# Patient Record
Sex: MALE | Race: WHITE | NOT HISPANIC OR LATINO | Employment: FULL TIME | URBAN - METROPOLITAN AREA
[De-identification: names, ages, dates, MRNs, and addresses within clinical notes are randomized per-mention and may not be internally consistent; named-entity substitution may affect disease eponyms.]

---

## 2018-02-13 ENCOUNTER — OFFICE VISIT CONVERTED (OUTPATIENT)
Dept: ONCOLOGY | Facility: HOSPITAL | Age: 53
End: 2018-02-13
Attending: PHYSICIAN ASSISTANT

## 2018-03-01 ENCOUNTER — OFFICE VISIT CONVERTED (OUTPATIENT)
Dept: ONCOLOGY | Facility: HOSPITAL | Age: 53
End: 2018-03-01
Attending: INTERNAL MEDICINE

## 2018-05-17 ENCOUNTER — OFFICE VISIT CONVERTED (OUTPATIENT)
Dept: ONCOLOGY | Facility: HOSPITAL | Age: 53
End: 2018-05-17
Attending: INTERNAL MEDICINE

## 2018-08-01 ENCOUNTER — OFFICE VISIT CONVERTED (OUTPATIENT)
Dept: ONCOLOGY | Facility: HOSPITAL | Age: 53
End: 2018-08-01
Attending: NURSE PRACTITIONER

## 2018-08-20 ENCOUNTER — OFFICE VISIT CONVERTED (OUTPATIENT)
Dept: GASTROENTEROLOGY | Facility: CLINIC | Age: 53
End: 2018-08-20
Attending: PHYSICIAN ASSISTANT

## 2019-01-31 ENCOUNTER — HOSPITAL ENCOUNTER (OUTPATIENT)
Dept: OTHER | Facility: HOSPITAL | Age: 54
Discharge: HOME | End: 2019-01-31

## 2019-02-06 ENCOUNTER — HOSPITAL ENCOUNTER (OUTPATIENT)
Dept: ULTRASOUND IMAGING | Facility: HOSPITAL | Age: 54
Discharge: HOME | End: 2019-02-06

## 2019-03-15 ENCOUNTER — OFFICE VISIT CONVERTED (OUTPATIENT)
Dept: OTOLARYNGOLOGY | Facility: CLINIC | Age: 54
End: 2019-03-15
Attending: OTOLARYNGOLOGY

## 2019-10-14 ENCOUNTER — HOSPITAL ENCOUNTER (OUTPATIENT)
Dept: OTHER | Facility: HOSPITAL | Age: 54
Discharge: HOME | End: 2019-10-14
Attending: FAMILY MEDICINE

## 2019-10-14 ENCOUNTER — HOSPITAL ENCOUNTER (OUTPATIENT)
Dept: GENERAL RADIOLOGY | Facility: HOSPITAL | Age: 54
Discharge: HOME | End: 2019-10-14
Attending: FAMILY MEDICINE

## 2020-03-11 ENCOUNTER — OFFICE VISIT CONVERTED (OUTPATIENT)
Dept: UROLOGY | Facility: CLINIC | Age: 55
End: 2020-03-11
Attending: NURSE PRACTITIONER

## 2020-03-12 ENCOUNTER — HOSPITAL ENCOUNTER (OUTPATIENT)
Dept: PERIOP | Facility: HOSPITAL | Age: 55
Setting detail: HOSPITAL OUTPATIENT SURGERY
Discharge: HOME | End: 2020-03-12
Attending: UROLOGY

## 2020-03-18 ENCOUNTER — OFFICE VISIT CONVERTED (OUTPATIENT)
Dept: UROLOGY | Facility: CLINIC | Age: 55
End: 2020-03-18
Attending: SURGERY

## 2020-04-03 ENCOUNTER — HOSPITAL ENCOUNTER (OUTPATIENT)
Dept: GENERAL RADIOLOGY | Facility: HOSPITAL | Age: 55
Discharge: HOME | End: 2020-04-03
Attending: UROLOGY

## 2020-04-03 ENCOUNTER — OFFICE VISIT CONVERTED (OUTPATIENT)
Dept: UROLOGY | Facility: CLINIC | Age: 55
End: 2020-04-03
Attending: UROLOGY

## 2020-04-08 ENCOUNTER — HOSPITAL ENCOUNTER (OUTPATIENT)
Dept: CARDIOLOGY | Facility: HOSPITAL | Age: 55
Discharge: HOME | End: 2020-04-08
Attending: SURGERY

## 2020-04-08 ENCOUNTER — OFFICE VISIT CONVERTED (OUTPATIENT)
Dept: SURGERY | Facility: CLINIC | Age: 55
End: 2020-04-08
Attending: SURGERY

## 2020-04-16 ENCOUNTER — HOSPITAL ENCOUNTER (OUTPATIENT)
Dept: GENERAL RADIOLOGY | Facility: HOSPITAL | Age: 55
Discharge: HOME | End: 2020-04-16
Attending: SURGERY

## 2020-08-14 ENCOUNTER — HOSPITAL ENCOUNTER (OUTPATIENT)
Dept: OTHER | Facility: HOSPITAL | Age: 55
Discharge: HOME | End: 2020-08-14
Attending: SURGERY

## 2020-08-19 ENCOUNTER — OFFICE VISIT CONVERTED (OUTPATIENT)
Dept: SURGERY | Facility: CLINIC | Age: 55
End: 2020-08-19
Attending: SURGERY

## 2020-09-30 ENCOUNTER — HOSPITAL ENCOUNTER (OUTPATIENT)
Dept: PREADMISSION TESTING | Facility: HOSPITAL | Age: 55
Discharge: HOME | End: 2020-09-30
Attending: SURGERY

## 2020-10-01 LAB — SARS-COV-2 RNA SPEC QL NAA+PROBE: NOT DETECTED

## 2020-10-05 ENCOUNTER — HOSPITAL ENCOUNTER (OUTPATIENT)
Dept: PERIOP | Facility: HOSPITAL | Age: 55
Setting detail: HOSPITAL OUTPATIENT SURGERY
Discharge: HOME | End: 2020-10-05
Attending: SURGERY

## 2020-10-21 ENCOUNTER — OFFICE VISIT CONVERTED (OUTPATIENT)
Dept: SURGERY | Facility: CLINIC | Age: 55
End: 2020-10-21
Attending: SURGERY

## 2020-10-21 ENCOUNTER — HOSPITAL ENCOUNTER (OUTPATIENT)
Dept: LAB | Facility: HOSPITAL | Age: 55
Discharge: HOME | End: 2020-10-21
Attending: SURGERY

## 2020-10-21 LAB — PSA SERPL-MCNC: 3.94 NG/ML (ref 0–4)

## 2020-11-05 ENCOUNTER — OFFICE VISIT CONVERTED (OUTPATIENT)
Dept: SURGERY | Facility: CLINIC | Age: 55
End: 2020-11-05
Attending: SURGERY

## 2020-11-05 ENCOUNTER — HOSPITAL ENCOUNTER (OUTPATIENT)
Dept: PREADMISSION TESTING | Facility: HOSPITAL | Age: 55
Discharge: HOME | End: 2020-11-05
Attending: SURGERY

## 2020-11-06 LAB — SARS-COV-2 RNA SPEC QL NAA+PROBE: NOT DETECTED

## 2020-11-10 ENCOUNTER — HOSPITAL ENCOUNTER (OUTPATIENT)
Dept: GASTROENTEROLOGY | Facility: HOSPITAL | Age: 55
Setting detail: HOSPITAL OUTPATIENT SURGERY
Discharge: HOME | End: 2020-11-10
Attending: SURGERY

## 2021-03-18 ENCOUNTER — OFFICE VISIT CONVERTED (OUTPATIENT)
Dept: OTOLARYNGOLOGY | Facility: CLINIC | Age: 56
End: 2021-03-18
Attending: OTOLARYNGOLOGY

## 2021-03-29 ENCOUNTER — HOSPITAL ENCOUNTER (OUTPATIENT)
Dept: OTHER | Facility: HOSPITAL | Age: 56
Discharge: HOME | End: 2021-03-29
Attending: OTOLARYNGOLOGY

## 2021-04-01 ENCOUNTER — TELEPHONE CONVERTED (OUTPATIENT)
Dept: OTOLARYNGOLOGY | Facility: CLINIC | Age: 56
End: 2021-04-01
Attending: OTOLARYNGOLOGY

## 2021-04-12 ENCOUNTER — HOSPITAL ENCOUNTER (OUTPATIENT)
Dept: ULTRASOUND IMAGING | Facility: HOSPITAL | Age: 56
Discharge: HOME | End: 2021-04-12
Attending: OTOLARYNGOLOGY

## 2021-04-21 ENCOUNTER — TELEPHONE CONVERTED (OUTPATIENT)
Dept: OTOLARYNGOLOGY | Facility: CLINIC | Age: 56
End: 2021-04-21
Attending: OTOLARYNGOLOGY

## 2021-05-10 NOTE — H&P
History and Physical      Patient Name: Jaime Ruano   Patient ID: [id number]   Sex: Male   YOB: 1965    Primary Care Provider: Antonia Lilly    Visit Date: November 5, 2020    Provider: Randal Fields MD   Location: [facility]   Location Address: [address] [place] [address]  [id number]   Location Phone: [phone]          Chief Complaint  · Outpatient History & Physical / Surgical Orders  · Follow Up  · Colon Consult      History Of Present Illness  Jaime Ruano is a 55 year old /White male who follows-up with a history of a colovesicular fistula as well as an incisional hernia. He reports he is having some pain at his previous IV site that has been going on for about a week or so. He is having some induration there and a little bit of pain. No signs of infection. No drainage. Additionally, he is due for his follow-up colonoscopy after his colon resection. He is not having any symptoms. He is eating and drinking normally and having regular bowel movements. No blood per rectum.       Past Medical History  Colovesical fistula; Diverticulosis; DVT (deep venous thrombosis); Thyroid nodule         Past Surgical History  Appendectomy; Colonoscopy; Partial cystectomy, simple; Sigmoid Colectomy With Anastomosis; Vasectomy         Medication List  Aspirin Low Dose 81 mg oral tablet,delayed release (DR/EC); Suprep Bowel Prep Kit 17.5-3.13-1.6 gram oral recon soln         Allergy List  amoxicillin; HEPARIN AGENTS; tetracycline       Allergies Reconciled  Family Medical History  Cancer, Unspecified; - No Family History of Colorectal Cancer         Social History  Alcohol (Current some day); Tobacco (Never)         Review of Systems  · Constitutional  o Denies  o : chills, fever  · Gastrointestinal  o Denies  o : nausea, vomiting      Vitals  Date Time BP Position Site L\R Cuff Size HR RR TEMP (F) WT  HT  BMI kg/m2 BSA m2 O2 Sat FR L/min FiO2 HC       11/05/2020  "09:38 AM       12  175lbs 2oz 5'  7\" 27.43 1.94             Physical Examination  · Constitutional  o Appearance  o : well developed, well-nourished, alert and in no acute distress  · Head and Face  o Head  o :   § Inspection  § : no deformities or lesions  · Eyes  o Conjunctivae  o : clear  o Sclerae  o : clear  · Neck  o Inspection/Palpation  o : normal appearance, no masses or tenderness, trachea midline  · Respiratory  o Respiratory Effort  o : breathing unlabored  o Inspection of Chest  o : normal appearance, no retractions  · Cardiovascular  o Heart  o : regular rate and rhythm  · Gastrointestinal  o Abdominal Examination  o : abdomen is soft   · Lymphatic  o Neck  o : no lymphadenopathy present  o Axilla  o : no lymphadenopathy present  o Groin  o : no lymphadenopathy present  · Skin and Subcutaneous Tissue  o General Inspection  o : his left arm has a small indurated area in the mid portion of his forearm. There is no overlying erythema. It is minimally tender to palpation, if at all. It feels like it may be either a lipoma or an old hematoma.   · Neurologic  o Cranial Nerves  o : grossly intact  o Sensation  o : grossly intact  o Gait and Station  o :   § Gait Screening  § : normal gait, able to stand without diffculty  o Cerebellar Function  o : no obvious abnormalities  · Psychiatric  o Judgement and Insight  o : judgment and insight intact  o Mood and Affect  o : mood normal, affect appropriate          Assessment  · Pre-Surgical Orders     V72.84  · Screening for colon cancer     V76.51/Z12.11  · Preop testing     V72.84/Z01.818  · History of colon resection     V45.89/Z90.49       Patient with a wound on his arm that is likely a small hematoma or lipoma.       Plan  · Orders  o Colonoscopy (23762) - V76.51/Z12.11, V45.89/Z90.49 - 11/10/2020  o [facility] [place] [facility] Pre-Op Covid-19 Screening (38053) - V72.84/Z01.818 - 11/05/2020  · Medications  o Medications have been Reconciled  o Transition of Care or Provider " Policy  · Instructions  o PLAN: Colonoscopy with possible biopsies  o Handouts Provided-Pre-Procedure Instructions including date and time and location of procedure.  o Surgical Facility: [facility]  o ****Patient Status****  o Outpatient  o ********************  o RISK AND BENEFITS:  o Consent for surgery: Given these options, the patient has verbally expressed an understanding of the risks of surgery and finds these risks acceptable. We will proceed with surgery as soon as possible.  o Consult Anesthesia for any post-operative block, or any pain management procedure deemed necessary by the anestesiologist for adequate post-operative pain control.   o O.R. PREP: Per protocol  o IV: Per Anesthesia  o PLEASE SIGN PERMIT FOR: Colonoscopy with possible biopsies  o Indocyanine Green- 2.5MG IV- On Call To OR  o *___The above History and Physical Examination has been completed within 30 days of admission.  o Electronically Identified Patient Education Materials Provided Electronically     I have offered him an ultrasound. At this point in time, I think it will resolve on its own so he wants to wait and will follow-up with me and if it doesn't resolve and we can schedule the ultrasound. In regards to his follow-up, we will go ahead and get him set up for his colonoscopy after his resection. Risks, benefits, and alternatives were discussed with the patient extensively. All questions were answered. The patient voiced understanding and agrees to proceed with the above plan.             Electronically Signed by: Salome Flores-, -Author on November 5, 2020 03:26:04 PM  Electronically Co-signed by: Randal Fields MD -Reviewer on November 6, 2020 11:17:33 PM

## 2021-05-12 NOTE — PROGRESS NOTES
Progress Note      Patient Name: Jaime Ruano   Patient ID: [id number]   Sex: Male   YOB: 1965    Primary Care Provider: Yesi ASTUDILLO   Referring Provider: Yesi ASTUDILLO    Visit Date: April 8, 2020    Provider: Randal Fields MD   Location: [facility]   Location Address: [address] [place] [address]  [id number]   Location Phone: [phone]          Chief Complaint  · Follow Up Surgery      History Of Present Illness  Jaime Ruano is a 54 year old /White male who presents today for a postoperative visit. He follows-up status post exploratory laparotomy and colovesicular fistula takedown. The patient reports he is doing pretty well after his procedure. He is eating and drinking and having regular bowel movements. He is not reporting any unexpected signs or symptoms. He is still feeling a little bit of soreness and pain but overall feels like he is doing fairly well. His only other complaint is some cramping and pain that ascends up his bilateral lower extremities, more on the left lower and right upper. He doesn't have any erythema or pain, it is mostly cramping sensation and swelling.       Past Medical History  Colovesical fistula; Diverticulosis; DVT (deep venous thrombosis); Thyroid nodule         Past Surgical History  Appendectomy; Colonoscopy; Partial cystectomy, simple; Sigmoid Colectomy With Anastomosis; Vasectomy         Medication List  Aspirin Low Dose 81 mg oral tablet,delayed release (DR/EC); hydrocodone-acetaminophen 5-325 mg oral tablet; tamsulosin 0.4 mg oral capsule         Allergy List  amoxicillin; HEPARIN AGENTS; tetracycline       Allergies Reconciled  Family Medical History  Cancer, Unspecified; - No Family History of Colorectal Cancer         Social History  Alcohol (Current some day); Tobacco (Never)         Review of Systems  · Constitutional  o Denies  o : chills, fever  · Gastrointestinal  o Denies  o : nausea,  "vomiting      Vitals  Date Time BP Position Site L\R Cuff Size HR RR TEMP (F) WT  HT  BMI kg/m2 BSA m2 O2 Sat HC       04/08/2020 09:22 AM       16  170lbs 4oz 5'  7\" 26.66 1.91           Physical Examination  · Constitutional  o Appearance  o : well developed, well-nourished, alert and in no acute distress  · Head and Face  o Head  o :   § Inspection  § : no deformities or lesions  · Eyes  o Conjunctivae  o : clear  o Sclerae  o : nonicteric  · Neck  o Inspection/Palpation  o : normal appearance, no masses or tenderness, trachea midline  · Respiratory  o Respiratory Effort  o : breathing unlabored  o Inspection of Chest  o : normal appearance, no retractions  · Cardiovascular  o Heart  o : regular rate and rhythm  · Gastrointestinal  o Abdominal Examination  o :   § Abdomen  § : soft, nontender, nondistended. His incision appears to be healing well. I removed his staples today and placed Steri-Strips.   · Lymphatic  o Neck  o : no lymphadenopathy present  o Axilla  o : no lymphadenopathy present  o Groin  o : no lymphadenopathy present  · Skin and Subcutaneous Tissue  o General Inspection  o : no rashes present, no lesions present, no areas of discoloration  · Neurologic  o Cranial Nerves  o : grossly intact  o Sensation  o : grossly intact  o Gait and Station  o :   § Gait Screening  § : normal gait, able to stand without diffculty  o Cerebellar Function  o : no obvious abnormalities  · Psychiatric  o Judgement and Insight  o : judgment and insight intact  o Mood and Affect  o : mood normal, affect appropriate  · Extremities  o Extremities  o : on his lower extremities, I don't feel any severe pitting edema or erythema. He doesn't have any severe tenderness to palpation.           Assessment  · Postoperative Exam Following Surgery     V67.00  · Leg swelling     729.81/M79.89  · History of DVT (deep vein thrombosis)     V12.51/Z86.718       Patient status post colovesicular fistula takedown.     Problems " Reconciled  Plan  · Orders  o Bilateral venous duplex scan of extremities (44851) - - 04/08/2020   Lower Extremities   · Medications  o Medications have been Reconciled  o Transition of Care or Provider Policy  · Instructions  o Electronically Identified Patient Education Materials Provided Electronically     The patient is doing well and healing as expected. I am pleased with his overall progress. His staples were removed today. I think we should do a colonoscopy in six months to evaluate his anastomosis but otherwise I think he is doing quite well. In regards to his lower extremity cramping and pain, I think given his significant history of lower extremity DVTs as well as PE. He reports he was having similar sensations when he had those clots. I think performing a lower extremity Doppler is a very reasonable course of action and we will get him set up for that.     Otherwise, he does not require further follow-up from me unless he has any questions, concerns, or other issues, until I see him again in six months. I discussed all of this with the patient. All questions were answered.  He voiced understanding and agreed to proceed with the above plan.             Electronically Signed by: Salome Flores-, -Author on April 13, 2020 10:01:15 AM  Electronically Co-signed by: Randal Fields MD -Reviewer on April 13, 2020 11:25:04 AM

## 2021-05-12 NOTE — PROGRESS NOTES
"   Progress Note      Patient Name: Jaime Ruano   Patient ID: [id number]   Sex: Male   YOB: 1965    Primary Care Provider: Yesi ASTUDILLO   Referring Provider: Yesi ASTUDILLO    Visit Date: April 3, 2020    Provider: Nini Sheridan MD   Location: [facility]   Location Address: [address] [place] [address]  [id number]   Location Phone: [phone]          Chief Complaint  · [facility]      History Of Present Illness  The patient is a(n) 54 year old /White male who returns for a routine post-operative visit after undergoing cystoscopy for colovesical fistula on 03/12/2020 and then subsequent excision of fistula and repair of bladder along with sigmoid colectomy with Dr. Fields.   The patient complains of no significant symptoms or problems since the surgery.   Since the surgery the patient has returned to full daily activities      Cystogram was normal and alex catheter removed today.       Past Medical History  Colovesical fistula; Diverticulosis; DVT (deep venous thrombosis); Thyroid nodule         Past Surgical History  Appendectomy; Colonoscopy; Partial cystectomy, simple; Sigmoid Colectomy With Anastomosis; Vasectomy         Medication List  Aspirin Low Dose 81 mg oral tablet,delayed release (DR/EC); hydrocodone-acetaminophen 5-325 mg oral tablet         Allergy List  amoxicillin; HEPARIN AGENTS; tetracycline         Family Medical History  Cancer, Unspecified; - No Family History of Colorectal Cancer         Social History  Alcohol (Current some day); Tobacco (Never)         Review of Systems  · Constitutional  o Denies  o : fever, chills  · Gastrointestinal  o Denies  o : nausea, vomiting      Vitals  Date Time BP Position Site L\R Cuff Size HR RR TEMP (F) WT  HT  BMI kg/m2 BSA m2 O2 Sat HC       04/03/2020 10:31 /79 Sitting       173lbs 4oz 5'  7\" 27.13 1.93           Physical Examination  · Constitutional  o Appearance  o : Well nourished, well " developed patient in no acute distress. Ambulating without difficulty.              Assessment  · Colovesical fistula     596.1/N32.1  · Prostate cancer screening     V76.44/Z12.5  · Postoperative Follow-Up Visit     V67.00  · Colovesical fistula     596.1/N32.1      Plan  · Orders  o PSA Ultrasensitive, ANNUAL SCREENING [facility] (64530, ) - V76.44/Z12.5 - 10/03/2020  · Medications  o tamsulosin 0.4 mg oral capsule   SIG: take 1 capsule (0.4 mg) by oral route once daily 1/2 hour following the same meal each day for 30 days   DISP: (30) capsules with 11 refills  Prescribed on 04/03/2020     o Medications have been Reconciled  o Transition of Care or Provider Policy  · Instructions  o Discussion and Plan: The patient has done well after surgery with no apparent complications. I have discussed the postoperative course to date, as well as, the expected progress going forward. The patient understands what complications to be concerned about. I will see the patient in routine follow-up, or sooner if needed.  o FOLLOW-UP:  o In 6 months            Electronically Signed by: Nini Sheridan MD -Author on May 29, 2020 11:23:09 AM

## 2021-05-13 NOTE — PROGRESS NOTES
"   Progress Note      Patient Name: Jaime Ruano   Patient ID: [id number]   Sex: Male   YOB: 1965    Primary Care Provider: Antonia Lilly    Visit Date: August 19, 2020    Provider: Randal Fields MD   Location: [facility]   Location Address: [address] [place] [address]  [id number]   Location Phone: [phone]          Chief Complaint  · Outpatient History & Physical / Surgical Orders  · Follow Up  · Hernia Consult      History Of Present Illness  Jaime Ruano is a 55 year old /White male who follows-up after a CT scan. I am familiar with Mr. Ruano after a colovesicular fistula takedown. He has done very well after his colovesicular fistula takedown. He has recently gotten back into a lot of physical activity and exercise. He noticed he is having some slight bulging around his umbilicus. We sent him for a CT scan secondary to this bulging and the CT scan shows two small periumbilical hernias about 2.0 cm a piece.       Past Medical History  Colovesical fistula; Diverticulosis; DVT (deep venous thrombosis); Thyroid nodule         Past Surgical History  Appendectomy; Colonoscopy; Partial cystectomy, simple; Sigmoid Colectomy With Anastomosis; Vasectomy         Medication List  Aspirin Low Dose 81 mg oral tablet,delayed release (DR/EC)         Allergy List  amoxicillin; HEPARIN AGENTS; tetracycline       Allergies Reconciled  Family Medical History  Cancer, Unspecified; - No Family History of Colorectal Cancer         Social History  Alcohol (Current some day); Tobacco (Never)         Review of Systems  · Gastrointestinal  o Denies  o : nausea, vomiting, diarrhea, constipation      Vitals  Date Time BP Position Site L\R Cuff Size HR RR TEMP (F) WT  HT  BMI kg/m2 BSA m2 O2 Sat HC       08/19/2020 02:51 PM         179lbs 0oz 5'  7\" 28.04 1.96           Physical Examination  · Constitutional  o Appearance  o : well developed, well-nourished, alert and in no acute " distress  · Head and Face  o Head  o :   § Inspection  § : no deformities or lesions  · Eyes  o Conjunctivae  o : clear  o Sclerae  o : clear  · Neck  o Inspection/Palpation  o : normal appearance, no masses or tenderness, trachea midline  · Respiratory  o Respiratory Effort  o : breathing unlabored  o Inspection of Chest  o : normal appearance, no retractions  · Cardiovascular  o Heart  o : regular rate and rhythm  · Gastrointestinal  o Abdominal Examination  o : abdomen is soft. His periumbilical area shows that he does have some small reducible hernias. They are nontender. No overlying skin changes.   · Lymphatic  o Neck  o : no lymphadenopathy present  o Axilla  o : no lymphadenopathy present  o Groin  o : no lymphadenopathy present  · Skin and Subcutaneous Tissue  o General Inspection  o : no rashes present, no lesions present, no areas of discoloration  · Neurologic  o Cranial Nerves  o : grossly intact  o Sensation  o : grossly intact  o Gait and Station  o :   § Gait Screening  § : normal gait, able to stand without diffculty  o Cerebellar Function  o : no obvious abnormalities  · Psychiatric  o Judgement and Insight  o : judgment and insight intact  o Mood and Affect  o : mood normal, affect appropriate          Assessment  · Pre-Surgical Orders     V72.84  · Hernia, Incisional     553.21/K43.2  · Pre-op testing     V72.84/Z01.818       Patient with incisional hernia from his colovesicular fistula takedown.     Problems Reconciled  Plan  · Orders  o General Surgery Order (GENOR) - 553.21/K43.2 - 08/31/2020  o [facility] Pre-Op Covid-19 Screening (83688) - V72.84/Z01.818 - 08/26/2020 08/26 @ 9AM -- [address]  · Medications  o Medications have been Reconciled  o Transition of Care or Provider Policy  · Instructions  o PLAN:   o Handouts Provided-Pre-Procedure Instructions including date and time and location of procedure.  o Surgical Facility: [facility]  o ****Patient  Status****  o Outpatient  o ********************  o RISK AND BENEFITS:  o Consent for surgery: Given these options, the patient has verbally expressed an understanding of the risks of surgery and finds these risks acceptable. We will proceed with surgery as soon as possible.  o Consult Anesthesia for any post-operative block, or any pain management procedure deemed necessary by the anestesiologist for adequate post-operative pain control.   o O.R. PREP: Per protocol  o IV: Per Anesthesia  o PLEASE SIGN PERMIT FOR: ROBOTIC INCISIONAL HERNIA REPAIR, POSSIBLY OPEN  o *__Kefzol 2 gram IV on call to OR.  o *___The above History and Physical Examination has been completed within 30 days of admission.  o Pre-Admission Testing Date: 08/26 @ 8AM - SAME DAY SURGERY  o Electronically Identified Patient Education Materials Provided Electronically     We will plan for robotic repair of these hernias. Risks, benefits, and alternatives were discussed with the patient extensively. All questions were answered. The patient voiced understanding and agrees to proceed with the above plan.             Electronically Signed by: Salome Flores-, -Author on August 20, 2020 01:40:09 PM  Electronically Co-signed by: Randal Fields MD -Reviewer on August 20, 2020 02:35:55 PM

## 2021-05-13 NOTE — PROGRESS NOTES
Progress Note      Patient Name: Jaime Ruano   Patient ID: [id number]   Sex: Male   YOB: 1965    Primary Care Provider: Antonia Lilly    Visit Date: October 21, 2020    Provider: Randal Fields MD   [place] [facility]   Location Address: [address] [place] [address]  [id number]   Location Phone: [phone]          Chief Complaint  · Follow Up Surgery      History Of Present Illness  Jaime Ruano is a 55 year old /White male who presents today for a postoperative visit. He follows-up status post robotic incisional hernia repair. The patient has done well after his procedure. He is not reporting any unexpected signs or symptoms. He is eating normally and having regular bowel movements. His pain is subsiding. He doesn't report any feelings of recurrence of the hernia.       Past Medical History  Colovesical fistula; Diverticulosis; DVT (deep venous thrombosis); Thyroid nodule         Past Surgical History  Appendectomy; Colonoscopy; Partial cystectomy, simple; Sigmoid Colectomy With Anastomosis; Vasectomy         Medication List  Aspirin Low Dose 81 mg oral tablet,delayed release (DR/EC)         Allergy List  amoxicillin; HEPARIN AGENTS; tetracycline       Allergies Reconciled  Family Medical History  Cancer, Unspecified; - No Family History of Colorectal Cancer         Social History  Alcohol (Current some day); Tobacco (Never)         Review of Systems  · Constitutional  o Denies  o : chills, excessive sweating, fatigue, fever, sycope/passing out, weight gain, weight loss  · Eyes  o Denies  o : changes in vision, blurry vision, double vision  · HENT  o Denies  o : loss of hearing, ringing in the ears, ear aches, sore throat, nasal congestion, sinus pain, nose bleeds, seasonal allergies  · Cardiovascular  o Denies  o : blood clots, swollen legs, anemia, easy burising or bleeding, transfusions  · Respiratory  o Denies  o : shortness of breath, dry cough,  "productive cough, pneumonia, COPD  · Gastrointestinal  o Denies  o : difficulty swallowing, reflux  · Genitourinary  o Denies  o : incontinence  · Neurologic  o Denies  o : headache, seizure, stroke, tremor, loss of balance, falls, dizziness/vertigo, difficulty with sleep, numbness/tingling/paresthesia , difficulty with coordination, difficulty with dexterity, weakness  · Musculoskeletal  o Denies  o : neck stiffness/pain, swollen lymph nodes, muscle aches, joint pain, weakness, spasms, sciatica, pain radiating in arm, pain radiating in leg, low back pain  · Endocrine  o Denies  o : diabetes, thyroid disorder  · Psychiatric  o Denies  o : anxiety, depression      Vitals  Date Time BP Position Site L\R Cuff Size HR RR TEMP (F) WT  HT  BMI kg/m2 BSA m2 O2 Sat FR L/min FiO2        10/21/2020 10:31 AM       14  174lbs 0oz 5'  7\" 27.25 1.93             Physical Examination  · Constitutional  o Appearance  o : well developed, well-nourished, alert and in no acute distress  · Head and Face  o Head  o :   § Inspection  § : no deformities or lesions  · Eyes  o Conjunctivae  o : clear  o Sclerae  o : nonicteric  · Neck  o Inspection/Palpation  o : normal appearance, no masses or tenderness, trachea midline  · Respiratory  o Respiratory Effort  o : breathing unlabored  o Inspection of Chest  o : normal appearance, no retractions  · Cardiovascular  o Heart  o : regular rate and rhythm  · Gastrointestinal  o Abdominal Examination  o :   § Abdomen  § : abdomen is soft. Incisions appear to be healing well. No signs of recurrence, even with Valsalva.   · Lymphatic  o Neck  o : no lymphadenopathy present  o Axilla  o : no lymphadenopathy present  o Groin  o : no lymphadenopathy present  · Skin and Subcutaneous Tissue  o General Inspection  o : no rashes present, no lesions present, no areas of discoloration  · Neurologic  o Cranial Nerves  o : grossly intact  o Sensation  o : grossly intact  o Gait and Station  o :   § Gait " Screening  § : normal gait, able to stand without diffculty  o Cerebellar Function  o : no obvious abnormalities  · Psychiatric  o Judgement and Insight  o : judgment and insight intact  o Mood and Affect  o : mood normal, affect appropriate          Assessment  · Encounter for examination following surgery     V67.00/Z09  · Screening PSA (prostate specific antigen)     V76.44/Z12.5       Patient status post robotic incisional hernia repair.       Plan  · Orders  o PSA Ultrasensitive, ANNUAL SCREENING [facility] ([id number], ) - V76.44/Z12.5 - 10/21/2020  · Medications  o Medications have been Reconciled  o Transition of Care or Provider Policy  · Instructions  o Electronically Identified Patient Education Materials Provided Electronically     The patient is doing well and healing as expected. I am pleased with his overall progress. At this point in time, he can follow-up with me as needed. Call with any questions or concerns. The patient reports he is due for a routine PSA. I will go ahead and order that and he can follow-up with his urologist for the results. I discussed all of this with the patient. All questions were answered. He voiced understanding and agreed to proceed with the above plan.             Electronically Signed by: Salome Flores-, -Author on October 21, 2020 02:26:26 PM  Electronically Co-signed by: Randal Fields MD -Reviewer on October 22, 2020 11:37:48 AM

## 2021-05-14 VITALS — BODY MASS INDEX: 27.48 KG/M2 | HEIGHT: 67 IN | RESPIRATION RATE: 12 BRPM | WEIGHT: 175.12 LBS

## 2021-05-14 VITALS — BODY MASS INDEX: 27.31 KG/M2 | HEIGHT: 67 IN | RESPIRATION RATE: 14 BRPM | WEIGHT: 174 LBS

## 2021-05-14 VITALS — HEIGHT: 67 IN | TEMPERATURE: 98.8 F | BODY MASS INDEX: 29.27 KG/M2 | WEIGHT: 186.5 LBS

## 2021-05-14 NOTE — PROGRESS NOTES
"   Progress Note      Patient Name: Jaime Ruano   Patient ID: [id number]   Sex: Male   YOB: 1965    Primary Care Provider: Antonia Lilly    Visit Date: March 18, 2021    Provider: Guilherme Haskins MD   [facility]   Location Address: [address] [place] [address]  [id number]          Chief Complaint     \"I just wanted to check in about my thyroid.\"       History Of Present Illness     Jaime Ruano is a 55 year old /White male with past medical history significant for factor V Leiden with history of DVT and PE who returns to the office today for follow-up of thyroid nodules.  He was originally seen on 3/15/2019 after the nodules were initially noted on workup of his DVT/PE. He reported a family history of hypothyroidism but denies any history of thyroid cancer. He denied any personal history of radiation exposure.  Thyroid ultrasound on 1/31/19 which revealed a right 0.8 x 0.8 x 1 cm solid nodule, a 0.6 cm hypoechoic nodule, and a right lower 1.5 x 1 x 1.7 cm nodule with several echogenic foci, and a left 0.6 cm hypoechoic nodule. Subsequent ultrasound-guided FNA of the right 1.7 cm nodule on 2/6/19 revealed atypia of undetermined significance (Lee category III).  Examination that day was unremarkable and we discussed a repeat FNA.  He was advised to call to arrange a repeat FNA versus follow-up ultrasound but was lost to follow-up.  He tells me that he has not had any issues since his last visit.  He denies any hoarseness, dysphagia, neck pain, fever, chills, night sweats, or unintentional weight loss.  He has not undergone any recent imaging.  He did have surgery this past year for a colovesicular fistula likely secondary to diverticulitis.  He is doing well.             Past Medical History  Colovesical fistula; Diverticulosis; DVT (deep venous thrombosis); Factor V Leiden; Thyroid nodule         Past Surgical History  Appendectomy; " "Colonoscopy; Partial cystectomy, simple; Sigmoid Colectomy With Anastomosis; Vasectomy         Medication List  Aspirin Low Dose 81 mg oral tablet,delayed release (DR/EC)         Allergy List  amoxicillin; HEPARIN AGENTS; tetracycline         Family Medical History  Cancer, Unspecified; - No Family History of Colorectal Cancer         Social History  Alcohol (Current some day); Tobacco (Never)         Review of Systems  · Constitutional  o Denies  o : fever, night sweats, weight loss  · Eyes  o Denies  o : discharge from eye, impaired vision  · HENT  o Admits  o : *See HPI  · Cardiovascular  o Denies  o : chest pain, irregular heart beats  · Respiratory  o Denies  o : shortness of breath, wheezing, coughing up blood  · Gastrointestinal  o Denies  o : heartburn, reflux, vomiting blood  · Genitourinary  o Denies  o : frequency  · Integument  o Denies  o : rash, skin dryness  · Neurologic  o Denies  o : seizures, loss of balance, loss of consciousness, dizziness  · Endocrine  o Denies  o : cold intolerance, heat intolerance  · Heme-Lymph  o Denies  o : easy bleeding, anemia      Vitals  Date Time BP Position Site L\R Cuff Size HR RR TEMP (F) WT  HT  BMI kg/m2 BSA m2 O2 Sat FR L/min FiO2 HC       03/18/2021 01:48 PM        98.8 186lbs 8oz 5'  7\" 29.21 2       03/18/2021 01:55 PM        98.8 186lbs 8oz 5'  7\" 29.21 2             Physical Examination  · Constitutional  o Appearance  o : well developed, well-nourished, alert and in no acute distress, voice clear and strong  · Head and Face  o Head  o :   § Inspection  § : no deformities or lesions  o Face  o :   § Inspection  § : No facial lesions; House-Brackmann I/VI bilaterally  § Palpation  § : No TMJ crepitus nor  muscle tenderness bilaterally  · Eyes  o Vision  o :   § Visual Fields  § : Extraocular movements are intact. No spontaneous or gaze-induced nystagmus.  o Conjunctivae  o : clear  o Sclerae  o : clear  o Pupils and Irises  o : pupils equal, round, " and reactive to light.   · Ears, Nose, Mouth and Throat  o Ears  o :   § External Ears  § : appearance within normal limits, no lesions present  § Otoscopic Examination  § : tympanic membrane appearance within normal limits bilaterally without perforations, well-aerated middle ears  § Hearing  § : intact to conversational voice both ears  o Nose  o :   § External Nose  § : appearance normal  § Intranasal Exam  § : mucosa within normal limits, vestibules normal, no intranasal lesions present, septum midline, sinuses non tender to percussion  o Oral Cavity  o :   § Oral Mucosa  § : oral mucosa normal without pallor or cyanosis  § Lips  § : lip appearance normal  § Teeth  § : normal dentition for age  § Gums  § : gums pink, non-swollen, no bleeding present  § Tongue  § : tongue appearance normal; normal mobility  § Palate  § : hard palate normal, soft palate appearance normal with symmetric mobility  o Throat  o :   § Oropharynx  § : no inflammation or lesions present, tonsils within normal limits  § Hypopharynx  § : Deferred secondary to gag reflex  § Larynx  § : Deferred secondary to gag reflex  · Neck  o Inspection/Palpation  o : normal appearance, no masses or tenderness, trachea midline; thyroid size normal, nontender, no nodules or masses present on palpation  · Respiratory  o Respiratory Effort  o : breathing unlabored  o Inspection of Chest  o : normal appearance, no retractions  · Cardiovascular  o Heart  o : regular rate and rhythm  · Lymphatic  o Neck  o : no lymphadenopathy present  o Supraclavicular Nodes  o : no lymphadenopathy present  o Preauricular Nodes  o : no lymphadenopathy present  · Skin and Subcutaneous Tissue  o General Inspection  o : Regarding face and neck - there are no rashes present, no lesions present, and no areas of discoloration  · Neurologic  o Cranial Nerves  o : cranial nerves II-XII are grossly intact bilaterally  o Gait and Station  o : normal gait, able to stand without  diffculty  · Psychiatric  o Judgement and Insight  o : judgment and insight intact  o Mood and Affect  o : mood normal, affect appropriate          Assessment  · Thyroid nodule     241.0/E04.1  · Factor V Leiden     289.81/D68.51      Plan  · Orders  o Ultrasound Thyroid. (30549) - 241.0/E04.1 - 03/18/2021  · Medications  o Medications have been Reconciled  o Transition of Care or Provider Policy  · Instructions  o Impressions and findings were discussed Mr. Ruano at great length. Currently, he is doing well and returns for follow-up of multiple thyroid nodules the largest of which was a 1.7 cm right inferior nodule with echogenic foci for which a previous FNA was consistent with atypia of undetermined significance. We discussed that he will require repeat ultrasound and we will discuss results over the phone and determine further action from there. He was given ample time to ask questions, all of which were answered to his satisfaction.            Electronically Signed by: Guilherme Haskins MD -Author on March 18, 2021 02:06:15 PM

## 2021-05-14 NOTE — PROGRESS NOTES
Progress Note      Patient Name: Jaime Ruano   Patient ID: [id number]   Sex: Male   YOB: 1965    Primary Care Provider: Antonia Lilly    Visit Date: April 21, 2021    Provider: Guilherme Haskins MD   [place] [facility]   Location Address: [address] [place] [address], [address]  [place], [place]  [id number]   Location Phone: [phone]          History Of Present Illness  TELEHEALTH TELEPHONE VISIT  Jaime Ruano is a 55 year old /White male who is presenting for evaluation via telehealth telephone visit. Verbal consent obtained before beginning visit. We are speaking today to discuss the results of his most recent ultrasound-guided FNA. He previously underwent an ultrasound-guided FNA of a right 1.7 cm nodule which was consistent with atypia of undetermined significance. Based on those 3/29/2021 thyroid ultrasound we discussed undergoing FNA of 2 of the 3 right-sided nodules. This was performed on 4/12/2021 and revealed the right superior nodule to be suspicious for follicular neoplasm (Auburn category 4) in the right inferior nodule to be consistent with follicular lesion of undetermined significance (Auburn category 3).   Provider spent 13 minutes with the patient during the telehealth visit.   The following staff were present during this visit: Guilherme Haskins MD   Past Medical History/ Overview of Patient Symptoms          Assessment  · Thyroid nodule     241.0/E04.1      Plan  · Orders  o Physician Telephone Evaluation, 11-20 minutes (30338) - 241.0/E04.1 - 04/21/2021  · Instructions  o Plan Of Care: Impressions and findings were discussed with Mr. Ruano at great length. We reviewed the results of his recent ultrasound and FNA which revealed the right superior nodule to be suspicious for follicular neoplasm (Auburn category 4) on the right inferior nodule to be consistent with a follicular lesion of undetermined significance (Auburn category 3). We discussed  that the actual risk of malignancy is 15 to 30% for the category for lesion and 5 to 15% for the category 3 lesion. At this time, I have recommended that he undergo a right thyroid lobectomy and isthmusectomy. We discussed the risks, benefits, and alternatives. He is going to think about his options and talk to him over with his wife. He will call once he reaches a decision regarding surgery.            Electronically Signed by: Guilherme Haskins MD -Author on April 21, 2021 04:31:36 PM

## 2021-05-14 NOTE — PROGRESS NOTES
Progress Note      Patient Name: Jaime Ruano   Patient ID: [id number]   Sex: Male   YOB: 1965    Primary Care Provider: Antonia Lilly    Visit Date: April 1, 2021    Provider: Guilherme Haskins MD   [facility] [facility] [place] [facility]   Location Address: [address] [place] [address]  [id number]          Chief Complaint            History Of Present Illness  TELEHEALTH TELEPHONE VISIT  Jaime Ruano is a 55 year old /White male who is presenting for evaluation via telehealth telephone visit. Verbal consent obtained before beginning visit. Prescription today to discuss the results of his 3/29/2021 thyroid ultrasound. Prior to this his most recent ultrasound was on 1/31/2019 after which she underwent an ultrasound-guided FNA of a right 1.7 cm nodule which was consistent with atypia of undetermined significance. He tells me that he has done well since his last appointment. 3/29/2021 thyroid ultrasound revealed a right 1.2 cm hypoechoic nodule, a right 1.6 cm nodule with calcifications, and a right 1.6 cm nodule. There is also left subcentimeter nodules. It was recommended that he undergo ultrasound-guided FNA of 2 of the 3 right-sided nodules.   Provider spent 7 minutes with the patient during the telehealth visit.   The following staff were present during this visit: Guilherme Haskins MD   Past Medical History/ Overview of Patient Symptoms          Assessment  · Thyroid nodule     241.0/E04.1      Plan  · Orders  o Physican Telephone evaluation, 5-10 min (24828) - 241.0/E04.1 - 04/01/2021  o FNA Thyroid w/ guidance (61640) - 241.0/E04.1 - 04/01/2021   the two larger right sided nodules please  · Instructions  o Plan Of Care: After a thorough discussion of options for further evaluation and management we will proceed with an ultrasound-guided FNA of the 2 larger right-sided nodules. We will discuss those results after.            Electronically Signed by: Guilherme FABIAN  MD Jozef -Author on April 1, 2021 03:26:46 PM

## 2021-05-15 VITALS — HEIGHT: 67 IN | BODY MASS INDEX: 27.82 KG/M2 | RESPIRATION RATE: 12 BRPM | WEIGHT: 177.25 LBS

## 2021-05-15 VITALS
HEIGHT: 67 IN | BODY MASS INDEX: 28.56 KG/M2 | SYSTOLIC BLOOD PRESSURE: 121 MMHG | WEIGHT: 182 LBS | DIASTOLIC BLOOD PRESSURE: 74 MMHG

## 2021-05-15 VITALS — BODY MASS INDEX: 26.72 KG/M2 | HEIGHT: 67 IN | WEIGHT: 170.25 LBS | RESPIRATION RATE: 16 BRPM

## 2021-05-15 VITALS
SYSTOLIC BLOOD PRESSURE: 101 MMHG | BODY MASS INDEX: 27.19 KG/M2 | HEIGHT: 67 IN | WEIGHT: 173.25 LBS | DIASTOLIC BLOOD PRESSURE: 79 MMHG

## 2021-05-15 VITALS — HEIGHT: 67 IN | WEIGHT: 179 LBS | BODY MASS INDEX: 28.09 KG/M2

## 2021-05-16 VITALS
OXYGEN SATURATION: 96 % | HEART RATE: 82 BPM | RESPIRATION RATE: 17 BRPM | HEIGHT: 67 IN | DIASTOLIC BLOOD PRESSURE: 80 MMHG | BODY MASS INDEX: 29.21 KG/M2 | SYSTOLIC BLOOD PRESSURE: 136 MMHG | TEMPERATURE: 98 F | WEIGHT: 186.12 LBS

## 2021-05-16 VITALS
RESPIRATION RATE: 12 BRPM | HEART RATE: 57 BPM | SYSTOLIC BLOOD PRESSURE: 117 MMHG | HEIGHT: 67 IN | OXYGEN SATURATION: 95 % | DIASTOLIC BLOOD PRESSURE: 66 MMHG | WEIGHT: 180.31 LBS | BODY MASS INDEX: 28.3 KG/M2

## 2021-05-28 VITALS
DIASTOLIC BLOOD PRESSURE: 70 MMHG | SYSTOLIC BLOOD PRESSURE: 111 MMHG | OXYGEN SATURATION: 95 % | HEART RATE: 63 BPM | WEIGHT: 179.01 LBS | HEIGHT: 68 IN | TEMPERATURE: 98.2 F | BODY MASS INDEX: 27.13 KG/M2

## 2021-05-28 VITALS
TEMPERATURE: 97.6 F | BODY MASS INDEX: 47.74 KG/M2 | SYSTOLIC BLOOD PRESSURE: 98 MMHG | OXYGEN SATURATION: 96 % | HEART RATE: 57 BPM | RESPIRATION RATE: 16 BRPM | HEIGHT: 68 IN | WEIGHT: 315 LBS | DIASTOLIC BLOOD PRESSURE: 66 MMHG

## 2021-05-28 VITALS
OXYGEN SATURATION: 94 % | DIASTOLIC BLOOD PRESSURE: 67 MMHG | HEIGHT: 67 IN | TEMPERATURE: 97.6 F | HEART RATE: 66 BPM | WEIGHT: 178 LBS | RESPIRATION RATE: 12 BRPM | SYSTOLIC BLOOD PRESSURE: 112 MMHG | BODY MASS INDEX: 27.94 KG/M2

## 2021-05-28 VITALS
TEMPERATURE: 97.9 F | SYSTOLIC BLOOD PRESSURE: 120 MMHG | RESPIRATION RATE: 12 BRPM | HEIGHT: 67 IN | HEART RATE: 78 BPM | DIASTOLIC BLOOD PRESSURE: 71 MMHG | BODY MASS INDEX: 28.61 KG/M2 | WEIGHT: 182.25 LBS | OXYGEN SATURATION: 94 %

## 2021-05-28 NOTE — PROGRESS NOTES
Patient: GAYLE RUANO     Acct: [id number]     Report: #[id number]  UNIT #: [id number]     : 1965    Encounter Date:2018  PRIMARY CARE: MARGARITA NEWBERRY  ***Signed***  --------------------------------------------------------------------------------------------------------------------  Visit Type      Established Patient Visit            Chief Complaint      DVT/PE due to heterozygous P506Q Factor V Leiden mutation            Referring Provider/Copies To      Referring Provider:  MARGARITA NEWBERRY            Allergies      Coded Allergies:             HEPARIN (Verified  Allergy, Unknown, REDNESS, 18)       WAS STARTED ON HEPARIN DRIP IN ED, RED PATCH APPEARED ON       RIGHT SIDE OF NECK, NO OTHER SYMPTOMS, ED GAVE BENEDRYL TO       BE SAFE, REDDNESS WENT AWAY.           TETRACYCLINE (Verified  Allergy, Unknown, TONGUE SWELLING, 18)            Medications      Last Reconciled on 18 16:06 by BAUDILIO IVORY      Apixaban (Eliquis) 5 Mg Tablet      5 MG PO BID for 30 Days, #60 TAB 3 Refills         Prov: Omid Mendoza         3/7/18      Medications Reviewed:  No Changes made to meds            History and Present Illness      Past Oncology Illness History      Mr. Gayle Ruano is a 52 year old  man with no history of blood clots     prior to 2018 who is a non smoker. He has an active lifestyle and SOA was noted     during exercise. He was admitted to [facility] at the end of 2018 after SOA     and coughing up blood. He was found to have bilateral pulmonary emboli and left     lower extremity DVT extending into mid thigh. Duplex 18 showed a LLE     tibial vein DVT. CT of the abdomen 18 during his admission showed acute     diverticulitis although he states he was asymptomatic at that time regarding     and abdominal pain.             He had a hypercoagulable workup while hospitalized which showed a heterozygous     R506Q Factor V Leiden mutation. He was started on Eliquis at  the beginning of     February 2018. He is followed by Dr. Pham (pulmonology) who has ordered a     repeat CT scan in April for further evaluation of PE resolution.  The patient     has a planned colonoscopy set for April 2018.             Shortly after hospitalization he developed abdominal pain and was treated for     diverticulitis. He completed his course of Cipro and Flagyl for diverticulitis.     The patient denies SOA, chest pain, headache. He denies any recent fever, chills    , or s/s of infection. No bleeding.            OTHER:      Recent Imaging            PROCEDURE:   CT CHEST W/ CONTRAST             COMPARISON:   None.             INDICATIONS:   SHORTNESS OF BREATH  X SEVERAL DAYS,PRESSURE ON LEFT SIDE OF     CHEST             TECHNIQUE:   After obtaining the patient's consent, CT images were obtained     with non-ionic       intravenous contrast material.               PROTOCOL:     Pulmonary embolism imaging protocol performed                RADIATION:     DLP: 603.7 mGy*cm          Automated exposure control was utilized to minimize radiation dose.       CONTRAST:   100 cc Isovue 370 I.V.             FINDINGS:         Along the inferior right thyroid lobe is a 1.2 cm thyroid nodule.             Multiple filling defects consistent with pulmonary emboli are noted in the     distal right pulmonary       artery as well as multiple segmental and subsegmental pulmonary artery branches     supplying the lower       lobes bilaterally.             No pleural or pericardial effusion is seen. There is no evidence of right     ventricular strain.             There is mild scarring or atelectasis in the lung bases bilaterally. The lungs     otherwise appear       clear.             There is diffuse fatty infiltration of the liver. No focal osseous lesion is     seen.             CONCLUSION:         1. Multiple pulmonary emboli in the distal right ulnar artery and multiple     bilateral lower lobe        pulmonary artery branches.      2. 1.2 cm right thyroid nodule.      3. Diffuse fatty infiltration of the liver.             I called the report to Dr. Butler at the time of dictation.              Facundo Rosario M.D.             Electronically Signed and Approved By: Facundo Rosario M.D. on 1/28/2018 at 18:00                   Recent Testing:            PROCEDURE:   VENOUS EVAL LOWER LIMITED             COMPARISON:   None.             INDICATIONS:   Positive Pulmonary Emboli             TECHNIQUE:   Color duplex Doppler ultrasound evaluation and analysis of the     deep venous system of the       lower extremity was performed in the usual manner.               Right Impression:       Common femoral has normal flow and compressibility, limited study by request             Left Impression:       The proximal thigh segment femoral vein, popliteal vein, and posterior tibial     vein demonstrated no       flow and are noncompressible with acute appearing thrombus             CONCLUSION:         1. Left lower extremity proximal thigh vein through posterior tibial vein deep     venous thrombosis,       acute.              Pedro Thakkar MD             Electronically Signed and Approved By: Pedro Thakkar MD on 1/29/2018 at 17:59            HPI - Oncology Interim      Presents today in follow up on his LLE DVT/PE which he was hospitalized in     January 2016.  He was found to have Factor V Leiden mutation. He was placed on     Eliquis at that time for 6 months.  He denies bruising, bleeding.            ECOG Performance Status      0            PAST, FAMILY   Past Medical History      Past Medical History:  Other      Hematology/oncology:  REPORTS HX OF: Anemia, Other hematologic history (PE)            Past Surgical History      REPORTS HX OF: Appendectomy            Family History      REPORTS HX OF: Other Cancer History (SISTER-UNKNOWN CANCER)            Social History      Lives independently:  Yes            Tobacco Use       Tobacco status:  Never smoker            Alcohol Use      Alcohol intake:  ONCE A MONTH            REVIEW OF SYSTEMS      General:  Denies: Appetite change, Fatigue, Weight loss      Eyes:  Denies: Blurred vision, Corrective lenses      Ears, nose, mouth, throat:  Denies: Headache, Seizures, Visual Changes      Cardiovascular:  Denies: Chest pain, Irregular heartbeat, Palpitations      Respiratory:  Denies: Shortness of Air, Productive cough, Coughing blood      Gastrointestinal:  Denies: Nausea, Vomiting, Constipation, Diarrhea      Kidney/Bladder:  Denies: Painful Urination, Blood in Urine      Musculoskeletal:  Denies: New Back pain, Muscle weakness      Skin:  DENIES: Bruises Easily, Nail changes, Rash      Neurological:  Denies: Dizziness, Numbness\Tingling      Psychiatric:  Complains of: AAO X 3, Denies: Anxiety, Depression      Endocrine:  DiabetesThyroid Disorder      Hematologic/lymphatic:  Denies: Bruising, Bleeding, Enlarged Lymph Nodes            VITAL SIGNS,PAIN/FATIGUE SCORE      Vitals      Height 5 ft 8.11 in / 173 cm      Weight 381 lbs 6.333 oz / 173 kg      BSA 2.99 m2      BMI 57.8 kg/m2      Temperature 97.6 F / 36.44 C - Temporal      Pulse 57      Respirations 16      Blood Pressure 98/66 Sitting, Left Arm      Pulse Oximetry 96%, RM AIR            Pain Score      Experiencing any pain?:  No            Fatigue Score      Experiencing any fatigue?:  No            General Appearance:  Alert, Oriented X3, Cooperative, No acute distress      Eyes:  Anicteric Sclerae, Moist Conjunctiva, PERRLA      HEENT:  Orophraynx clear, No Erythema, No Exudates, No Pallor, No Thrush      Neck:  Supple, No Carotid Bruits      Respiratory:  CTAB, No Diminished Breath      Abdomen\Gastro:  Soft, No NABS      Cardio:  RRR, No Murmur, No, Peripheral Edema, Normal PMI      Skin:  Normal Temperature, Normal Texture and Turgor, No Rash, lesions, ulcers      Psychiatric:  Appropriate Affect, Intact Judgement, AAO  x3      Neuro:  Cranial Nerve II-XII Inta, No Focal Sensory Deficit      Muscularskeletal:  Normal Gait and Station, Full ROM of extremeties, Full     muscle strength\tone      Extremities:  No Digital Cyanosis, Normal Gait and station, No Weakness      Lymphatic:  No Cervical, No Supraclavicular, No Infraclavicular, No Axillary            PREVENTION      Hx Influenza Vaccination:  No      Influenza Vaccine Declined:  Yes      2 or More Falls Past Year?:  No      Fall Past Year with Injury?:  No      Hx Pneumococcal Vaccination:  No      Encouraged to follow-up with:  PCP regarding preventative exams.      Chart initiated by      ANURADHA MARTIN MA            IMPRESSION/PLAN      Impression      Hypercoagulable disorder            Plan      1. Will discontinue Eliquis.  Will decrease to one daily x 7 days then off.      2. He has a colonoscopy scheduled with Dr. Anand in August      3. Discussed interventions to decrease DVTS      4. I discussed with him should he develop a second DVT then he will be on     lifelong anticoagulation.  He voiced understanding of this.        5. Follow up appointment was not made but would be happy to see him in return     if needed.            Diagnosis      DVT (deep venous thrombosis)       Acute deep vein thrombosis (DVT) of left lower extremity, unspecified vein       DVT location: lower extremity       Affected thrombotic vein of extremity: unspecified vein of extremity       Chronicity: acute       Laterality: left            Pulmonary air embolism       Pulmonary air embolism, subsequent encounter       Encounter type: subsequent encounter            Factor V Leiden mutation - D68.51            Patient Education      Patient Education Provided:  Yes                 Disclaimer: Converted document may not contain table formatting or lab diagrams. Please see [employer] [facility] for the authenticated document.

## 2021-05-28 NOTE — PROGRESS NOTES
Patient: GAYLE ESCOBEDO     Acct: [id number]     Report: #[id number]  UNIT #: [id number]     : 1965    Encounter Date:2018  PRIMARY CARE: MARGARITA NEWBERRY  ***Signed***  --------------------------------------------------------------------------------------------------------------------  Chief Complaint      Encounter Date      May 17, 2018            Primary Care Provider      MARGARITA NEWBERRY            Referring Provider      MARGARITA NEWBERRY            Patient Complaint      Patient is complaining of      F/U PE            VITALS      Height 5 ft 7 in / 170.18 cm      Weight 182 lbs 4 oz / 82.528948 kg      BSA 2.00 m2      BMI 28.5 kg/m2      Temperature 97.9 F / 36.61 C - Oral      Pulse 78      Respirations 12      Blood Pressure 120/71 Sitting, Left Arm      Pulse Oximetry 94%, ROOMAIR            HPI      The patient is a very pleasant 52 year old  male her for follow up for     pulmonary embolism and DVT.  Reportedly he has factor V Leiden.  Since last     visit he had repeat duplex ultrasound showing no DVT in right extremity and DVT     that is chronic in the left superficial femoral vein with some evidence of     recannulation.  Chest CT was done showing resolution of all pulmonary infarcts     and nodules and just some mild bibasilar atelectasis with resolution of     pulmonary emboli.  He is doing well today. he denies any dyspnea, cough, wheeze    , chest pain or hemoptysis.  Denies any nausea, vomiting, fevers, chills,     headaches, chest pain or hemoptysis.  He is able to perform ADLs without     difficulty.  Denies any swollen glands or lymph nodes of the head and neck.            He is on Eliquis and finishes his six months in August.  Given the factor V     Leiden, he did see hematology.  The plan is after he completes his Eliquis to     be on aspirin 81 mg for lifelong.            I have personally reviewed the review of systems, past family, social, surgical     and medical  histories and I agree with the findings.            ROS      Constitutional:  Denies: Fatigue, Fever, Weight gain, Weight loss, Chills,     Insomnia, Other      Respiratory/Breathing:  Denies: Shortness of air, Wheezing, Cough, Hemoptysis,     Pleuritic pain, Other      Eyes:  Denies: Blurred vision, Vision Changes, Other      Ears, nose, mouth, throat:  Denies: Mouth lesions, Thrush, Throat pain,     Hoarseness, Allergies/Hay Fever, Post Nasal Drip, Headaches, Recent Head Injury    , Nose Bleeding, Neck Stiffness, Thyroid Mass, Hearing Loss, Ear Fullness, Dry     Mouth, Nasal or Sinus Pain, Dry Lips, Nasal discharge, Nasal congestion, Other      Cardiovascular:  Denies: Palpitations, Syncope, Claudication, Chest Pain, Wake     up Gasping for air, Leg Swelling, Irregular Heart Rate, Cyanosis, Dyspnea on     Exertion, Other      Gastrointestinal:  Denies: Nausea, Constipation, Diarrhea, Abdominal pain,     Vomiting, Difficulty Swallowing, Reflux/Heartburn, Dysphagia, Jaundice, Bloating    , Melena, Bloody stools, Other      Genitourinary:  Denies: Urinary frequency, Incontinence, Hematuria, Urgency,     Nocturia, Dysuria, Testicular problems, Other      Musculoskeletal:  Denies: Joint Pain, Joint Stiffness, Joint Swelling, Myalgias    , Other      Hematologic/lymphatic:  DENIES: Lymphadenopathy, Bruising, Bleeding tendencies,     Other      Neurological:  Denies: Headache, Numbness, Weakness, Seizures, Other      Psychiatric:  Denies: Anxiety, Appropriate Effect, Depression, Other      Sleep:  No: Excessive daytime sleep, Morning Headache?, Snoring, Insomnia?,     Stop breathing at sleep?, Other      Integumentary:  Denies: Rash, Dry skin, Skin Warm to Touch, Other      Immunologic/Allergic:  Denies: Latex allergy, Seasonal allergies, Asthma,     Urticaria, Eczema, Other      Immunization status:  No: Up to date            FAMILY/SOCIAL/MEDICAL HX      Surgical History:  Yes: Appendectomy (AT AGE 9-10 YEARS OLD. ),  Bowel Surgery (    COLONOSCOPY), Orthopedic Surgery (R SHOULDER MUSCLES/SPURR REAPIR 2012)      Cancer/Type - Family Hx:  Sister      Is Mother Still Living?:  Yes       Family History:  Yes      Social History:  No Tobacco Use, No Alcohol Use, No Recreational Drug use      Smoking status:  Never smoker      Anticoagulation Therapy:  Yes      Antibiotic Prophylaxis:  No      Medical History:  Yes: Diverticulitis, Miscellaneous Medical/oth (dvt/pe ), No:     Arthritis, Blood Disease, Chemotherapy/Cancer, Deafness or Ringing Ears,     Hemorrhoids/Rectal Prob, Shortness Of Breath      Psychiatric History      NONE            PREVENTION      Hx Influenza Vaccination:  No      Influenza Vaccine Declined:  Yes      2 or More Falls Past Year?:  No      Fall Past Year with Injury?:  No      Hx Pneumococcal Vaccination:  No      Encouraged to follow-up with:  PCP regarding preventative exams.      Chart initiated by      COLBY/ MA            ALLERGIES/MEDICATIONS      Allergies:        Coded Allergies:             HEPARIN (Verified  Allergy, Unknown, REDNESS, 5/17/18)       WAS STARTED ON HEPARIN DRIP IN ED, RED PATCH APPEARED ON       RIGHT SIDE OF NECK, NO OTHER SYMPTOMS, ED GAVE BENEDRYL TO       BE SAFE, REDDNESS WENT AWAY.           TETRACYCLINE (Verified  Allergy, Unknown, TONGUE SWELLING, 5/17/18)      Medications    Last Reconciled on 5/17/18 16:30 by REBEL WELLS MD      Apixaban (Eliquis) 5 Mg Tablet      5 MG PO BID for 30 Days, #60 TAB 3 Refills         Prov: Omid Mendoza         3/7/18      Current Medications      Current Medications Reviewed 5/17/18            EXAM      Vital Signs Reviewed.      General:  WDWN, Alert, NAD.      HEENT: PERRL, EOMI.  OP, nares clear, no sinus tenderness.      Chest: Good aeration, clear to auscultation bilaterally, tympanic to percussion     bilaterally, no work of breathing noted.      CV: RRR, no MGR, pulses 2+, equal.        Abd: Soft, NT, ND, +BS, no HSM.      EXT:   No clubbing, no cyanosis, trace lower lower extremity BLE edema, no joint     tenderness.        Neuro:  A  Skin: No rashes or lesions.      Vtials      Vitals:             Height 5 ft 7 in / 170.18 cm           Weight 182 lbs 4 oz / 82.605032 kg           BSA 2.00 m2           BMI 28.5 kg/m2           Temperature 97.9 F / 36.61 C - Oral           Pulse 78           Respirations 12           Blood Pressure 120/71 Sitting, Left Arm           Pulse Oximetry 94%, ROOMAIR            REVIEW      Results Reviewed      PCCS Results Reviewed?:  Yes Prev Lab Results, Yes Prev Radiology Results, Yes     Previous [facility] Records      Lab Results      I reviewed Dr. Mendoza's last office note. I reviewed RASHEEDA Cummins's office     note. I reviewed factor V testing showing he was positive for factor V Leiden.      Radiographic Results               [facility]                VASCULAR LAB REPORT            Patient: GAYLE ESCOBEDO   Acct: #[id number]   Report: #[id number]            UNIT #: [id number]    DOS: 18 1321      INSURANCE:[employer] [facility]   ORDER #:[id number]      LOCATION:[place] [address]   : 1965            PROVIDERS      ADMITTING:  Sukumar Lafleur   ATTENDING: Sukumar Lafleur      FAMILY:  NONE,MD   ORDERING:  CASEY HEATON         OTHER:    DICTATING:  Pedro Thakkar MD            REQ #:[id number]   EXAM:VELEL - VENOUS EVAL LOWER LIMITED      REASON FOR EXAM:  Positive Pulmonary Emboli      REASON FOR VISIT:  PE AND DVT            *******Signed******         PROCEDURE:   VENOUS EVAL LOWER LIMITED             COMPARISON:   None.             INDICATIONS:   Positive Pulmonary Emboli             TECHNIQUE:   Color duplex Doppler ultrasound evaluation and analysis of the     deep venous system of the       lower extremity was performed in the usual manner.               Right Impression:       Common femoral has normal flow and compressibility, limited study  by request             Left Impression:       The proximal thigh segment femoral vein, popliteal vein, and posterior tibial     vein demonstrated no       flow and are noncompressible with acute appearing thrombus             CONCLUSION:         1. Left lower extremity proximal thigh vein through posterior tibial vein deep     venous thrombosis,       acute.              Pedro Thakkar MD             Electronically Signed and Approved By: Pedro Thakkar MD on 2018 at 17:59                            Until signed, this is an unconfirmed preliminary report that may contain      errors and is subject to change.                                              VELHE:      D:18 1759               [facility]                PACS RADIOLOGY REPORT            Patient: GAYLE ESCOBEDO   Acct: #[id number]   Report: #[id number]            UNIT #: [id number]    DOS: 18 1600      INSURANCE:[employer] [facility]   ORDER #:[id number]      LOCATION:[place]     : 1965            PROVIDERS      ADMITTING:     ATTENDING: Janessa Mendoza PA-C      FAMILY:  MARGARITA NEWBERRY   ORDERING:  Janessa Mendoza PA-C         OTHER:    DICTATING:  Priyank Aaron MD            REQ #:[id number]   EXAM:CHW - CT CHEST with CONTRAST      REASON FOR EXAM:        REASON FOR VISIT:  PULMONARY EMBOLISM/SHORTNESS OF BREATH            *******Signed******         PROCEDURE:   CT CHEST W/ CONTRAST             COMPARISON:   [facility], CT, ABD PEL W/O CONTRAST, 2018, 20:    52.  [facility], CT, CHEST W/ CONTRAST, 2018, 17:05.             INDICATIONS:   FOLLOW UP PE; STILL TAKING BLOOD THINNER. NO CHEST COMPLAINTS.             TECHNIQUE:   After obtaining the patient's consent, CT images were obtained     with non-ionic       intravenous contrast material.               PROTOCOL:     Pulmonary embolism imaging protocol performed                 RADIATION:     DLP: 570mGy*cm          Automated exposure control was utilized to minimize radiation dose.       CONTRAST:   100cc Isovue 370 I.V.             FINDINGS:         No evidence of a pulmonary arterial filling defect to suggest pulmonary     embolism.  The previously       seen pulmonary emboli have resolved in the interim.  The heart and great     vessels of the chest are       normal in size.  No pericardial effusion.  No pathologically enlarged     intrathoracic lymph nodes are       identified.  Stable nodular thyroid gland.             Low lung volumes.  Bilateral dependent atelectasis.  Lungs otherwise clear.  No     pneumothorax or       pleural effusion.             Limited imaging of the upper abdomen is unremarkable.  Mild lower thoracic     spondylosis.  No acute       osseous abnormality or concerning osseous lesion.             CONCLUSION:         1. Study is negative for pulmonary embolism.  No acute intrathoracic     abnormality.  Previously seen       pulmonary emboli have resolved in the interim.      2. Low lung volumes with bilateral dependent atelectasis.      3. Nodular thyroid gland.              JOSE ALFREDO SOUZA MD             Electronically Signed and Approved By: JOSE ALFREDO SOUZA MD on 5/14/2018 at 17:31                        Until signed, this is an unconfirmed preliminary report that may contain      errors and is subject to change.                                              COLOT:      D:05/14/18 173            Assessment      IMPRESSION:      1.  Bilateral pulmonary emboli.      2.  Left lower extremity DVT.      3. Factor V Leiden deficiency.              PLAN:      1.   Chest CT just shows mild bibasilar atelectasis and resolution of PEs.      2.  Lower extremity duplex DVT just shows recannulated left superficial vein     DVT.      3. Complete six months of Eliquis and put on aspirin 81 mg afterwards.      4. Follow up with hematology as planned in August.      5. Follow up  with us as needed.            Patient Education            Patient Education:        Deep Vein Thrombosis                 Disclaimer: Converted document may not contain table formatting or lab diagrams. Please see [facility] for the authenticated document.

## 2021-05-28 NOTE — PROGRESS NOTES
Patient: GAYLE ESCOBEDO     Acct: [id number]     Report: #[id number]  UNIT #: [id number]     : 1965    Encounter Date:2018  PRIMARY CARE: MARGARITA NEWBERRY  ***Signed***  --------------------------------------------------------------------------------------------------------------------  Chief Complaint      Mar 1, 2018      PE/DVT            Vitals      Height 5 ft 8.11 in / 173.0 cm      Weight 179 lbs 0.216 oz / 81.2 kg      BSA 1.99 m2      BMI 27.1 kg/m2      Temperature 98.2 F / 36.78 C - Temporal      Pulse 63      Blood Pressure 111/70 Sitting, Right Arm      Pulse Oximetry 95%, ROOM AIR            General Information      Primary Provider:  MARGARITA NEWBERRY            Allergies      Coded Allergies:             HEPARIN (Verified  Allergy, Unknown, REDNESS, 3/1/18)       WAS STARTED ON HEPARIN DRIP IN ED, RED PATCH APPEARED ON       RIGHT SIDE OF NECK, NO OTHER SYMPTOMS, ED GAVE BENEDRYL TO       BE SAFE, REDDNESS WENT AWAY.           TETRACYCLINE (Verified  Allergy, Unknown, TONGUE SWELLING, 3/1/18)            Medications      Last Reconciled on 3/1/18 11:36 by YIMI OSORIO MD      Apixaban (Eliquis) 5 Mg Tablet      5 MG PO BID for 30 Days, #60 TAB         Prov: Sukumar Lafleur         18      Current Medications      Current Medications Reviewed 3/1/18            Pain and Fatigue Scales      Pain Assessment:           Experiencing any pain?:  No      Fatigue:           Experiencing any fatigue?:  No            Past Medical History      Yes Other      Hematology/oncology:  REPORTS HX OF: Anemia, Other hematologic history (PE)            Past Surgical History      REPORTS HX OF: Appendectomy            Family History      REPORTS HX OF: Other Cancer History (SISTER-UNKNOWN CANCER)            Social History      Yes            Tobacco Use      Tobacco status:  Never smoker            Alcohol Use      Alcohol intake:  ONCE A MONTH            Past Oncology Illness History      Chief Complaint: PE/DVT             Mr. Jaime Ruano is a 52 year old  man with no history of blood clots     prior to 2018 who is a non smoker. He has an active lifestyle and SOA was noted     during exercise. He was admitted to [facility] at the end of January 2018 after SOA     and coughing up blood. He was found to have bilateral pulmonary emboli and left     lower extremity DVT extending into mid thigh. Duplex 1/28/18 showed a LLE     tibial vein DVT. CT of the abdomen 1/29/18 during his admission showed acute     diverticulitis although he states he was asymptomatic at that time regarding     and abdominal pain.             He had a hypercoagulable workup while hospitalized which showed a heterozygous     R506Q Factor V Leiden mutation. He was started on Eliquis at the beginning of     February 2018. He is followed by Dr. Pham (pulmonology) who has ordered a     repeat CT scan in April for further evaluation of PE resolution.  The patient     has a planned colonoscopy set for April 2018.             Shortly after hospitalization he developed abdominal pain and was treated for     diverticulitis. He completed his course of Cipro and Flagyl for diverticulitis.     The patient denies SOA, chest pain, headache. He denies any recent fever, chills    , or s/s of infection. No bleeding.            OTHER:      Recent Imaging            PROCEDURE:   CT CHEST W/ CONTRAST             COMPARISON:   None.             INDICATIONS:   SHORTNESS OF BREATH  X SEVERAL DAYS,PRESSURE ON LEFT SIDE OF     CHEST             TECHNIQUE:   After obtaining the patient's consent, CT images were obtained     with non-ionic       intravenous contrast material.               PROTOCOL:     Pulmonary embolism imaging protocol performed                RADIATION:     DLP: 603.7 mGy*cm          Automated exposure control was utilized to minimize radiation dose.       CONTRAST:   100 cc Isovue 370 I.V.             FINDINGS:         Along the inferior right thyroid  lobe is a 1.2 cm thyroid nodule.             Multiple filling defects consistent with pulmonary emboli are noted in the     distal right pulmonary       artery as well as multiple segmental and subsegmental pulmonary artery branches     supplying the lower       lobes bilaterally.             No pleural or pericardial effusion is seen. There is no evidence of right     ventricular strain.             There is mild scarring or atelectasis in the lung bases bilaterally. The lungs     otherwise appear       clear.             There is diffuse fatty infiltration of the liver. No focal osseous lesion is     seen.             CONCLUSION:         1. Multiple pulmonary emboli in the distal right ulnar artery and multiple     bilateral lower lobe       pulmonary artery branches.      2. 1.2 cm right thyroid nodule.      3. Diffuse fatty infiltration of the liver.             I called the report to Dr. Butler at the time of dictation.              Facundo Rosario M.D.             Electronically Signed and Approved By: Facundo Rosario M.D. on 1/28/2018 at 18:00                   Recent Testing:            PROCEDURE:   VENOUS EVAL LOWER LIMITED             COMPARISON:   None.             INDICATIONS:   Positive Pulmonary Emboli             TECHNIQUE:   Color duplex Doppler ultrasound evaluation and analysis of the     deep venous system of the       lower extremity was performed in the usual manner.               Right Impression:       Common femoral has normal flow and compressibility, limited study by request             Left Impression:       The proximal thigh segment femoral vein, popliteal vein, and posterior tibial     vein demonstrated no       flow and are noncompressible with acute appearing thrombus             CONCLUSION:         1. Left lower extremity proximal thigh vein through posterior tibial vein deep     venous thrombosis,       acute.              Pedro Thakkar MD             Electronically Signed and Approved  By: Pedro Thakkar MD on 1/29/2018 at 17:59            ROS      General:  Complains of: Fatigue      Eyes:  Denies: Blurred vision      Ears, nose, mouth, throat:  Denies: Headache      Cardiovascular:  Denies: Chest pain      Respiratory:  Denies: Shortness of Air      Gastrointestinal:  Denies: Vomiting      Kidney/Bladder:  Denies: Blood in Urine      Musculoskeletal:  Denies: Leg Cramps      Skin:  DENIES: Bruises Easily      Neurological:  Denies: Dizziness      Psychiatric:  Complains of: AAO X 3      Endocrine:  Diabetes      Hematologic/lymphatic:  Denies: Bruising            Vitals            Vital Signs              Date Time  Temp Pulse Resp B/P (MAP) Pulse Ox O2 Delivery O2 Flow Rate FiO2             2/13/18 08:14 97.6 66 12 112/67 94 roomair@rest              General Appearance:  Alert, Oriented X3, No acute distress      Eyes:  Anicteric Sclerae, Moist Conjunctiva      HEENT:  Orophraynx clear, No Erythema      Neck:  Supple, Full ROM      Respiratory:  CTAB, No Diminished Breath      Abdomen\Gastro:  Soft, No Masses      Cardio:  RRR, No Murmur, No, Peripheral Edema      Skin:  Normal Temperature, Normal Tone, No Rash, lesions, ulcers      Psychiatric:  Appropriate Affect, AAO x3      Neuro:  Cranial Nerve II-XII Inta, No Focal Sensory Deficit      Muscularskeletal:  Normal Gait and Station, Full ROM of extremeties      Extremities:  No Digital Cyanosis, No Digital Ischemia      Lymphatic:  No Cervical, No Supraclavicular, No Axillary            Lab Results      Laboratory Tests      2/1/18 04:55            Laboratory Tests            Test        1/31/18      04:56             Magnesium Level        2.10 mg/dL      (1.60-2.30)            Current Plan      It was a pleasure meeting Mr. Ruano and I appreciate the opportunity to     participate in his care. I have reviewed and summarized his previous medical     records and outside medical records including labs as noted.             As noted imaging  1/28/18 was consistent with a LLE tibial vein DVT and multiple     pulmonary emboli. As noted he was started on anticoagulation and I agree with     this recommendation. As noted he had a hypercoagulable workup while     hospitalized which showed a heterozygous R506Q Factor V Leiden mutation. I     discussed with him that I felt this was a provoked DVT due to the presence of     infection (diverticulitis) which could have exacerbated the DVT/PE. We did     discuss that treatment of an unprovoked versus provoked DVT can be different. I     did discuss with him that I favor the use of anticoagulant for 6 months before     consideration of discontinuation of anticoagulation at that time. I did discuss     with him that should he have a second DVT or PE then my recommendation would be     indefinite or lifelong anticoagulation especially in the setting of the     heterozygous factor V Leiden mutation.            He is scheduled for a colonoscopy in April 2018 and I agree this should be     performed for age appropriate cancer screening.              I again counseled him that given the negative work up so far that the infection     and diverticulitis may have caused or exacerbated the DVT/PE. He understood     these recommendations vs the recommendation of lifelong anticoagulation and was     in agreement.            Continue on Eliquis for a total of 6 months. RTC in 5 months. Continue follow-    up with Dr. Hirsch, and PCP Dr. Rivera. Mr. Ruano ask if he may return to     work and I feel that he may return to work at this time.             Available for immediate release. Please forward a copy of this dictation to      Dr. Allen Rivera.      Pulmonary embolism - I26.99            DVT (deep venous thrombosis) - I82.409            On anticoagulant therapy - Z79.01      Review/Other:  Received Lab Test, Reviewed Radiology Test, Reviewed Medicine     Test, Discussed Care with other Physician, Reviewed Image Tracing or  Specimen,     Obtained Old Records, Reviewed and Summarized Old Records, Decision to Obtain     Old Records      Patient Education Provided:  Yes      ECOG Score:  0            Hx Influenza Vaccination:  No      Influenza Vaccine Declined:  Yes      2 or More Falls Past Year?:  No      Fall Past Year with Injury?:  No      Hx Pneumococcal Vaccination:  No      Encouraged to follow-up with:  PCP regarding preventative exams.      Chart initiated by      MITZI NAPOLES                 Disclaimer: Converted document may not contain table formatting or lab diagrams. Please see [email or web address] for the authenticated document.

## 2021-05-28 NOTE — PROGRESS NOTES
Patient: GAYLE ESCOBEDO     Acct: [id number]     Report: #[id number]  UNIT #: [id number]     : 1965    Encounter Date:2018  PRIMARY CARE: MARGARITA NEWBERRY  ***Signed***  --------------------------------------------------------------------------------------------------------------------  Chief Complaint      Encounter Date      2018            TRANSITION OF CARE 14D      Transition of Care      FLAKO 14 Day Followup      Gayle presents to office for follow up post discharge from inpatient status     within 14 calendar days. Patient was contacted within 2 business days via phone     conversation. Documentation of that phone call is present in the patient's     electronic chart. He  was admitted to inpatient facility on 18and was     discharged on 18due to:  .            Admitting MD: arun             His  discharge summary has been reviewed and placed in the patient's electronic     chart.            Final Diagnosis            1.  Bilateral PE with lower extremity DVT, provoked.             2.  History of bronchitis failed outpatient therapy.            3.  Right shoulder surgery.            Problem List      Problem List Reviewed      Patient's problem list has been reviewed from patient discharge summary.            Medications Reviewed      Meds Reviewed      Patient s outpatient medication list has been reconciled with the medication     list from the discharge summary and has been reviewed with the patient.            VITALS      Height 5 ft 7 in / 170.18 cm      Weight 178 lbs 0 oz / 80.835742 kg      BSA 1.97 m2      BMI 27.9 kg/m2      Temperature 97.6 F / 36.44 C - Oral      Pulse 66      Respirations 12      Blood Pressure 112/67 Sitting, Right Arm      Pulse Oximetry 94%, [email or web address]            HPI      The patient is a very pleasant 52 year old white male who presents with his     wife for hospital follow up after being admitted at [facility]     with  shortness of breath and left lower extremity edema, found to have     bilateral pulmonary emboli and the left lower extremity DVT extending into his     mid thigh.  He was treated with heparin and transitioned to Eliquis and reports     that he has overall done quite well since his discharge.  He is currently not     complaining of any shortness of breath with ambulation or while completing his     daily activities, although he has not heavily exerted himself since he left the     hospital.  He has not had any recent surgery, has no history of malignancy     noted on CT of the abdomen and pelvis during his admission.  He had no recent     long travel such as long flights or car rides, no recent immobilization and has     no history of prior PE or DVT.  He does report that his mother had a pulmonary     emboli recently, but she is in her 80s and did not have a history of them prior     to this.  He did have a hypercoagulable workup started by his PCP and was told     that he has factor 5 Leiden deficiency.  He is also scheduled to see Dr. Mendoza     with hematology oncology, but has not yet seen him.  He did also have some     abdominal pain after his discharge and was noted to have acute diverticulitis     on the CT scan of the abdomen, so he has been started on Cipro and Flagyl by     his PCP for a ten day course which he is still taking, but is going to finish     this week.  He reports he has been lightheaded and has had diarrhea while     taking those medications.  He denies any hemoptysis, hematuria or blood in his     stool.  He has admitted to mild left lower extremity edema while ambulating     which is resolves after resting and elevating his leg.  He is a never smoker     and has no history of asthma or COPD.              I have reviewed her ROS, medical, surgical and family history and agree with     those as entered.            ROS      Constitutional:  Denies: Fatigue, Fever, Weight gain, Weight loss,  Chills,     Insomnia, Other      Respiratory/Breathing:  Complains of: Cough, Denies: Shortness of air, Wheezing    , Hemoptysis, Pleuritic pain, Other      Endocrine:  Denies: Polydipsia, Polyuria, Heat/cold intolerance, Diabetes, Other      Eyes:  Denies: Blurred vision, Vision Changes, Other      Ears, nose, mouth, throat:  Denies: Congestion, Dysphagia, Hearing Changes,     Nose Bleeding, Nasal Discharge, Throat pain, Tinnitus, Other      Cardiovascular:  Denies: Chest Pain, Exertional dyspnea, Peripheral Edema,     Palpitations, Syncope, Wake up Gasping for air, Orthopnea, Tachycardia, Other      Gastrointestinal:  Denies: Abdominal pain/cramping, Bloody stools, Constipation    , Diarrhea, Melena, Nausea, Vomiting, Other      Genitourinary:  Denies: Dysuria, Urinary frequency, Incontinence, Hematuria,     Urgency, Other      Musculoskeletal:  Denies: Joint Pain, Joint Stiffness, Joint Swelling, Myalgias    , Other      Hematologic/lymphatic:  DENIES: Lymphadenopathy, Bruising, Bleeding tendencies,     Other      Neurologic:  Denies: Headache, Numbness, Weakness, Seizures, Other      Psychiatric:  Denies: Anxiety, Appropriate Effect, Depression, Other      Sleep:  No: Excessive daytime sleep, Morning Headache?, Snoring, Insomnia?,     Stop breathing at sleep?, Other      Integumentary:  Denies: Rash, Dry skin, Skin Warm to Touch, Other            FAMILY/SOCIAL/MEDICAL HX      Surgical History:  Yes: Appendectomy (AT AGE 9-10 YEARS OLD. ), Bowel Surgery (    COLONOSCOPY), Orthopedic Surgery (R SHOULDER MUSCLES/SPURR REAPIR 2012)      Cancer/Type - Family Hx:  Sister (rectal)      Is Mother Still Living?:  Yes      Social History:  Tobacco Use      Smoking status:  Never smoker      Anticoagulation Therapy:  Yes      Antibiotic Prophylaxis:  No      Medical History:  Yes: Diverticulitis, Miscellaneous Medical/oth (dvt/pe ), No:     Arthritis, Blood Disease, Chemotherapy/Cancer, Deafness or Ringing Ears,      Hemorrhoids/Rectal Prob, Shortness Of Breath            Hx Influenza Vaccination:  No      Influenza Vaccine Declined:  Yes      2 or More Falls Past Year?:  No      Fall Past Year with Injury?:  No      Hx Pneumococcal Vaccination:  No      Encouraged to follow-up with:  PCP regarding preventative exams.      Chart initiated by      love hernandez ma            ALLERGIES/MEDICATIONS      Allergies:        Coded Allergies:             HEPARIN (Verified  Allergy, Unknown, REDNESS, 2/13/18)       WAS STARTED ON HEPARIN DRIP IN ED, RED PATCH APPEARED ON       RIGHT SIDE OF NECK, NO OTHER SYMPTOMS, ED GAVE BENEDRYL TO       BE SAFE, REDDNESS WENT AWAY.           TETRACYCLINE (Verified  Allergy, Unknown, TONGUE SWELLING, 2/13/18)      Medications    Last Reconciled on 2/13/18 08:51 by KALEB MEDELLIN PA-C      Ciprofloxacin HCl (Cipro) 750 Mg Tablet      750 MG PO BID, TAB 0 Refills         Reported         2/13/18       Metronidazole (Flagyl*) 500 Mg Tablet      500 MG PO TID, TAB 0 Refills         Reported         2/13/18       Apixaban (Eliquis) 5 Mg Tablet      5 MG PO BID for 30 Days, #60 TAB         Prov: Sukumar Lafleur         1/31/18      Current Medications      Current Medications Reviewed 2/13/18            EXAM      Vital Signs Reviewed.      General:  WDWN, Alert, NAD.      HEENT: PERRL, EOMI.  OP, nares clear, no sinus tenderness.      Neck: Supple, no JVD, no thyromegaly.      Lymph: No axillary, cervical, supraclavicular lymphadenopathy noted bilaterally.      Chest: Grossly clear to auscultation bilaterally, no wheezes, rhonchi or     crackles appreciated.        CV: RRR, no MGR, pulses 2+, equal.        Abd: Soft, NT, ND, +BS, no HSM.      EXT: Bilateral lower extremities are warm and dry with trace edema to his left     lower extremity.  No edema to his right lower extremity.        Neuro:  A  Skin: No rashes or lesions.      Vitals      Vitals:             Height 5 ft 7 in / 170.18 cm           Weight 178  lbs 0 oz / 80.727188 kg           BSA 1.97 m2           BMI 27.9 kg/m2           Temperature 97.6 F / 36.44 C - Oral           Pulse 66           Respirations 12           Blood Pressure 112/67 Sitting, Right Arm           Pulse Oximetry 94%, [email or web address]@rest            REVIEW      Results Reviewed      PCCS Results Reviewed?:  Yes Prev Lab Results, Yes Prev Radiology Results, Yes     Previous Mecial Records      Lab Results      I have personally reviewed his hospital notes including our pulmonary     consultation, progress note and discharge summary as well as lab work and     imaging.      Radiographic Results               [facility]                PACS RADIOLOGY REPORT            Patient: GAYLE ESCOBEDO   Acct: #[id number]   Report: #[id number]            UNIT #: [id number]    DOS: 18 1507      INSURANCE:[employer] OUT OF STATE   ORDER #:[id number]      LOCATION:ER     : 1965            PROVIDERS      ADMITTING:     ATTENDING:       FAMILY:  NONE,MD   ORDERING:  CASEY HEATON         OTHER:    DICTATING:  Facundo Rosario MD            REQ #:[id number]   EXAM:CHW - CT CHEST with CONTRAST      REASON FOR EXAM:  Shortness of Breath      REASON FOR VISIT:  POSSIBLE BLOOD CLOT LT LEG            *******Signed******         PROCEDURE:   CT CHEST W/ CONTRAST             COMPARISON:   None.             INDICATIONS:   SHORTNESS OF BREATH  X SEVERAL DAYS,PRESSURE ON LEFT SIDE OF     CHEST             TECHNIQUE:   After obtaining the patient's consent, CT images were obtained     with non-ionic       intravenous contrast material.               PROTOCOL:     Pulmonary embolism imaging protocol performed                RADIATION:     DLP: 603.7 mGy*cm          Automated exposure control was utilized to minimize radiation dose.       CONTRAST:   100 cc Isovue 370 I.V.             FINDINGS:         Along the inferior right thyroid lobe is a 1.2 cm thyroid  nodule.             Multiple filling defects consistent with pulmonary emboli are noted in the     distal right pulmonary       artery as well as multiple segmental and subsegmental pulmonary artery branches     supplying the lower       lobes bilaterally.             No pleural or pericardial effusion is seen. There is no evidence of right     ventricular strain.             There is mild scarring or atelectasis in the lung bases bilaterally. The lungs     otherwise appear       clear.             There is diffuse fatty infiltration of the liver. No focal osseous lesion is     seen.             CONCLUSION:         1. Multiple pulmonary emboli in the distal right ulnar artery and multiple     bilateral lower lobe       pulmonary artery branches.      2. 1.2 cm right thyroid nodule.      3. Diffuse fatty infiltration of the liver.             I called the report to Dr. Butler at the time of dictation.              Facundo Rosario M.D.             Electronically Signed and Approved By: Facundo Rosario M.D. on 1/28/2018 at 18:00                   Until signed, this is an unconfirmed preliminary report that may contain      errors and is subject to change.                                              WURRO:      D:01/28/18 1800            PLAN      Assessment      Pulmonary emboli - I26.99            Factor V Leiden - D68.51            SOB (shortness of breath) - R06.02            DVT (deep venous thrombosis) - I82.409            Notes      New Medications      * Metronidazole (Flagyl*) 500 MG TABLET: 500 MG PO TID      * Ciprofloxacin HCl (Cipro) 750 MG TABLET: 750 MG PO BID      Discontinued Medications      * Cephalexin Monohydrate (Keflex*) 250 MG/5 ML SUSP: 500 MG PO TID 7 Days #210       Instructions: PT NOTED 2ND ROUND OF KEFELX FOR BRONCITITS, ONLY TAKEN FOR PAST     2 DAYS AND MD WANTED HIM TO TAKE FOR 11 DAYS.      * Albuterol/Ipratropium (Duoneb) 3 ML AMPUL.NEB: 3 ML INH RTQ8H 14 Days #42       Instructions:  DIAGNOSIS CODE REQUIRED PRIOR TO PRESCRIBING.      * Apixaban (Eliquis) 5 MG TABLET: 10 MG PO BID 7 Days #28      * NEBULIZER/COMPRESSOR (Nebulizer) 1 EACH EACH: EACH XX ONCE #1       Instructions: Use as directed to administer nebulized medications.      * NEBULIZER ACCESSORIES (Nebulizer Filter) 1 EACH EACH: EACH XX ONCE #1       Instructions: Use as directed to administer nebulized medications.      * NEBULIZER ACCESSORIES (Nebulizer Kit Regular) 1 EACH KIT: EACH XX ONCE #1       Instructions: Use as directed to administer nebulized medications.      * NEBULIZER ACCESSORIES (Nebulizer Mask Adult) 1 EACH EACH: EACH XX ONCE #1       Instructions: Use as directed to administer nebulized medications.      New Diagnostics      * Chest W/ Cont CT, 2 Months       Dx: Pulmonary emboli - I26.99      * Duplex Scan LE, 2 Months       Dx: Pulmonary emboli - I26.99      ASSESSMENT:       1. Bilateral pulmonary emboli.      2. Left lower extremity DVT.      3.  Shortness of breath, resolving.      4.  Factor 5 Leiden deficiency.      5.  Acute diverticulitis.              PLAN:      1.  At this time, since the patient had what appears to be an unprovoked DVT,     we will continue him on Eliquis 5 mg twice a day for at least six months,     likely even lifelong given results that his PCP is obtained showing factor 5     Leiden deficiency.        2.  We will also see what Dr. Mendoza from hematology recommends in terms of     anticoagulation and duration once his sees him.        3.  I will repeat a CT scan of the chest in two to two and a half months to     ensure resolution of his pulmonary emboli.  I will also recheck a venous Duplex     of his bilateral lower extremities to ensure that DVT has resolved or is     resolving.        4. As the patient was concerned about the lightheadedness that he has     experienced while taking antibiotics, I have checked orthostatic BPs for him     today and he is not orthostatic. Thus I have  counseled him that the     lightheadedness is likely secondary to the Flagyl and Cipro and to let us know     or let his PCP know if the symptoms do not resolve after he finishes taking     those.        5. He is due for a flu shot, but declines to have that done today. We will see     him back in two to two and a half months after he has his repeat testing to see     how he is doing and to go over those results.                 Disclaimer: Converted document may not contain table formatting or lab diagrams. Please see [employer] for the authenticated document.

## 2021-06-11 DIAGNOSIS — Z87.19 H/O HERNIA REPAIR: ICD-10-CM

## 2021-06-11 DIAGNOSIS — Z98.890 H/O HERNIA REPAIR: ICD-10-CM

## 2021-06-11 DIAGNOSIS — N32.1 COLOVESICAL FISTULA: Primary | ICD-10-CM

## 2021-06-18 ENCOUNTER — TELEPHONE (OUTPATIENT)
Dept: SURGERY | Facility: CLINIC | Age: 56
End: 2021-06-18

## 2021-06-18 NOTE — TELEPHONE ENCOUNTER
Patient said Dr. Fields ordered imaging for him on his abdomen last week, and he was told to call today, if he was not contacted with the appointment.  He has still not been scheduled.

## 2021-06-21 NOTE — TELEPHONE ENCOUNTER
Called and spoke with pt to see if the [facility] called him with an appt for his CT and they did and that is scheduled on 7/08 at 1030AM and pts phone visit with Dr Fields is on 7/13 at 130PM. Pt is aware to make sure the phone visit is covered under his insurance and he stated he has had phone visits in the past and that they was covered. Pt is aware and wcb with any other questions or concerns.

## 2021-07-08 ENCOUNTER — HOSPITAL ENCOUNTER (OUTPATIENT)
Dept: CT IMAGING | Facility: HOSPITAL | Age: 56
Discharge: HOME | End: 2021-07-08
Admitting: SURGERY

## 2021-07-08 DIAGNOSIS — Z87.19 H/O HERNIA REPAIR: ICD-10-CM

## 2021-07-08 DIAGNOSIS — Z98.890 H/O HERNIA REPAIR: ICD-10-CM

## 2021-07-08 PROCEDURE — 74177 CT ABD & PELVIS W/CONTRAST: CPT

## 2021-07-08 PROCEDURE — 0 IOPAMIDOL PER 1 ML: Performed by: SURGERY

## 2021-07-08 RX ADMIN — IOPAMIDOL 100 ML: 755 INJECTION, SOLUTION INTRAVENOUS at 11:00

## 2021-08-13 ENCOUNTER — TELEPHONE (OUTPATIENT)
Dept: OTOLARYNGOLOGY | Facility: CLINIC | Age: 56
End: 2021-08-13

## 2021-08-13 NOTE — TELEPHONE ENCOUNTER
Caller: GAYLE ESCOBEDO    Relationship: SELF    Best call back number: [phone]    What is the best time to reach you: ANYTIME    Who are you requesting to speak with (clinical staff, provider,  specific staff member): CLINICAL STAFF      What was the call regarding: GAYLE ESCOBEDO REQUEST TO SCHEDULE SURGERY THAT HIM AND DR. ESCOTO DISCUSSED PRIOR. HE DID NOT WANT TO SCHEDULE A CONSULTATION.     Do you require a callback: YES

## 2021-08-25 ENCOUNTER — PREP FOR SURGERY (OUTPATIENT)
Dept: OTHER | Facility: HOSPITAL | Age: 56
End: 2021-08-25

## 2021-08-25 ENCOUNTER — TELEPHONE (OUTPATIENT)
Dept: OTOLARYNGOLOGY | Facility: CLINIC | Age: 56
End: 2021-08-25

## 2021-08-25 DIAGNOSIS — D49.7 FOLLICULAR NEOPLASM OF THYROID: Primary | ICD-10-CM

## 2021-08-25 NOTE — TELEPHONE ENCOUNTER
Called and left patient message that pre-op Covid test has been scheduled for him on 9/2/2021 @ 9am at [facility] and surgery as per his request is scheduled for 9/7/2021. Advised patient to hold baby aspirin x 3 days prior to surgery per Dr. Haskins. To call back with any questions or concerns.

## 2021-08-26 RX ORDER — IMIQUIMOD 12.5 MG/.25G
CREAM TOPICAL
COMMUNITY
Start: 2021-07-21 | End: 2021-10-28

## 2021-08-27 ENCOUNTER — ANESTHESIA EVENT (OUTPATIENT)
Dept: PERIOP | Facility: HOSPITAL | Age: 56
End: 2021-08-27

## 2021-09-01 ENCOUNTER — LAB (OUTPATIENT)
Dept: LAB | Facility: HOSPITAL | Age: 56
End: 2021-09-01

## 2021-09-01 DIAGNOSIS — D49.7 FOLLICULAR NEOPLASM OF THYROID: Primary | ICD-10-CM

## 2021-09-01 PROCEDURE — U0004 COV-19 TEST NON-CDC HGH THRU: HCPCS

## 2021-09-01 PROCEDURE — C9803 HOPD COVID-19 SPEC COLLECT: HCPCS

## 2021-09-03 LAB — SARS-COV-2 RNA NOSE QL NAA+PROBE: NOT DETECTED

## 2021-09-07 ENCOUNTER — HOSPITAL ENCOUNTER (OUTPATIENT)
Facility: HOSPITAL | Age: 56
Setting detail: SURGERY ADMIT
Discharge: HOME | End: 2021-09-07
Attending: OTOLARYNGOLOGY | Admitting: OTOLARYNGOLOGY

## 2021-09-07 ENCOUNTER — ANESTHESIA (OUTPATIENT)
Dept: PERIOP | Facility: HOSPITAL | Age: 56
End: 2021-09-07

## 2021-09-07 VITALS
TEMPERATURE: 97.3 F | BODY MASS INDEX: 28.37 KG/M2 | WEIGHT: 180.78 LBS | HEIGHT: 67 IN | DIASTOLIC BLOOD PRESSURE: 77 MMHG | OXYGEN SATURATION: 97 % | SYSTOLIC BLOOD PRESSURE: 136 MMHG | HEART RATE: 78 BPM | RESPIRATION RATE: 18 BRPM

## 2021-09-07 DIAGNOSIS — D49.7 FOLLICULAR NEOPLASM OF THYROID: ICD-10-CM

## 2021-09-07 LAB — QT INTERVAL: 457 MS

## 2021-09-07 PROCEDURE — 25010000002 MIDAZOLAM PER 1MG: Performed by: ANESTHESIOLOGY

## 2021-09-07 PROCEDURE — 25010000002 DEXAMETHASONE PER 1 MG: Performed by: NURSE ANESTHETIST, CERTIFIED REGISTERED

## 2021-09-07 PROCEDURE — 25010000002 PROPOFOL 10 MG/ML EMULSION: Performed by: NURSE ANESTHETIST, CERTIFIED REGISTERED

## 2021-09-07 PROCEDURE — 25010000003 CEFAZOLIN PER 500 MG: Performed by: NURSE ANESTHETIST, CERTIFIED REGISTERED

## 2021-09-07 PROCEDURE — 88307 TISSUE EXAM BY PATHOLOGIST: CPT | Performed by: OTOLARYNGOLOGY

## 2021-09-07 PROCEDURE — 93010 ELECTROCARDIOGRAM REPORT: CPT | Performed by: INTERNAL MEDICINE

## 2021-09-07 PROCEDURE — 60220 PARTIAL REMOVAL OF THYROID: CPT | Performed by: OTOLARYNGOLOGY

## 2021-09-07 PROCEDURE — 25010000002 HYDROMORPHONE 1 MG/ML SOLUTION: Performed by: NURSE ANESTHETIST, CERTIFIED REGISTERED

## 2021-09-07 PROCEDURE — 93005 ELECTROCARDIOGRAM TRACING: CPT | Performed by: ANESTHESIOLOGY

## 2021-09-07 PROCEDURE — C1889 IMPLANT/INSERT DEVICE, NOC: HCPCS | Performed by: OTOLARYNGOLOGY

## 2021-09-07 PROCEDURE — 25010000002 FENTANYL CITRATE (PF) 50 MCG/ML SOLUTION: Performed by: NURSE ANESTHETIST, CERTIFIED REGISTERED

## 2021-09-07 DEVICE — ABSORBABLE HEMOSTAT (OXIDIZED REGENERATED CELLULOSE, U.S.P.)
Type: IMPLANTABLE DEVICE | Site: NECK | Status: FUNCTIONAL
Brand: SURGICEL

## 2021-09-07 RX ORDER — SODIUM CHLORIDE 0.9 % (FLUSH) 0.9 %
10 SYRINGE (ML) INJECTION EVERY 12 HOURS SCHEDULED
Status: DISCONTINUED | OUTPATIENT
Start: 2021-09-07 | End: 2021-09-07 | Stop reason: HOSPADM

## 2021-09-07 RX ORDER — PHENYLEPHRINE HCL IN 0.9% NACL 1 MG/10 ML
SYRINGE (ML) INTRAVENOUS AS NEEDED
Status: DISCONTINUED | OUTPATIENT
Start: 2021-09-07 | End: 2021-09-07 | Stop reason: SURG

## 2021-09-07 RX ORDER — HYDROCODONE BITARTRATE AND ACETAMINOPHEN 7.5; 325 MG/1; MG/1
1 TABLET ORAL EVERY 4 HOURS PRN
Qty: 18 TABLET | Refills: 0 | Status: SHIPPED | OUTPATIENT
Start: 2021-09-07 | End: 2021-09-10

## 2021-09-07 RX ORDER — DEXAMETHASONE SODIUM PHOSPHATE 4 MG/ML
INJECTION, SOLUTION INTRA-ARTICULAR; INTRALESIONAL; INTRAMUSCULAR; INTRAVENOUS; SOFT TISSUE AS NEEDED
Status: DISCONTINUED | OUTPATIENT
Start: 2021-09-07 | End: 2021-09-07 | Stop reason: SURG

## 2021-09-07 RX ORDER — PROMETHAZINE HYDROCHLORIDE 25 MG/1
25 SUPPOSITORY RECTAL ONCE AS NEEDED
Status: DISCONTINUED | OUTPATIENT
Start: 2021-09-07 | End: 2021-09-07 | Stop reason: HOSPADM

## 2021-09-07 RX ORDER — OXYCODONE HYDROCHLORIDE 5 MG/1
5 TABLET ORAL
Status: DISCONTINUED | OUTPATIENT
Start: 2021-09-07 | End: 2021-09-07 | Stop reason: HOSPADM

## 2021-09-07 RX ORDER — FENTANYL CITRATE 50 UG/ML
INJECTION, SOLUTION INTRAMUSCULAR; INTRAVENOUS AS NEEDED
Status: DISCONTINUED | OUTPATIENT
Start: 2021-09-07 | End: 2021-09-07 | Stop reason: SURG

## 2021-09-07 RX ORDER — LIDOCAINE HYDROCHLORIDE AND EPINEPHRINE 10; 10 MG/ML; UG/ML
INJECTION, SOLUTION INFILTRATION; PERINEURAL AS NEEDED
Status: DISCONTINUED | OUTPATIENT
Start: 2021-09-07 | End: 2021-09-07 | Stop reason: HOSPADM

## 2021-09-07 RX ORDER — ONDANSETRON 2 MG/ML
4 INJECTION INTRAMUSCULAR; INTRAVENOUS ONCE AS NEEDED
Status: DISCONTINUED | OUTPATIENT
Start: 2021-09-07 | End: 2021-09-07 | Stop reason: HOSPADM

## 2021-09-07 RX ORDER — CEFAZOLIN SODIUM 1 G/3ML
INJECTION, POWDER, FOR SOLUTION INTRAMUSCULAR; INTRAVENOUS AS NEEDED
Status: DISCONTINUED | OUTPATIENT
Start: 2021-09-07 | End: 2021-09-07 | Stop reason: SURG

## 2021-09-07 RX ORDER — SODIUM CHLORIDE 0.9 % (FLUSH) 0.9 %
10 SYRINGE (ML) INJECTION AS NEEDED
Status: DISCONTINUED | OUTPATIENT
Start: 2021-09-07 | End: 2021-09-07 | Stop reason: HOSPADM

## 2021-09-07 RX ORDER — PROPOFOL 10 MG/ML
VIAL (ML) INTRAVENOUS AS NEEDED
Status: DISCONTINUED | OUTPATIENT
Start: 2021-09-07 | End: 2021-09-07 | Stop reason: SURG

## 2021-09-07 RX ORDER — LIDOCAINE HYDROCHLORIDE 20 MG/ML
INJECTION, SOLUTION INFILTRATION; PERINEURAL AS NEEDED
Status: DISCONTINUED | OUTPATIENT
Start: 2021-09-07 | End: 2021-09-07 | Stop reason: SURG

## 2021-09-07 RX ORDER — MIDAZOLAM HYDROCHLORIDE 2 MG/2ML
2 INJECTION, SOLUTION INTRAMUSCULAR; INTRAVENOUS ONCE
Status: COMPLETED | OUTPATIENT
Start: 2021-09-07 | End: 2021-09-07

## 2021-09-07 RX ORDER — MAGNESIUM HYDROXIDE 1200 MG/15ML
LIQUID ORAL AS NEEDED
Status: DISCONTINUED | OUTPATIENT
Start: 2021-09-07 | End: 2021-09-07 | Stop reason: HOSPADM

## 2021-09-07 RX ORDER — SUCCINYLCHOLINE/SOD CL,ISO/PF 100 MG/5ML
SYRINGE (ML) INTRAVENOUS AS NEEDED
Status: DISCONTINUED | OUTPATIENT
Start: 2021-09-07 | End: 2021-09-07 | Stop reason: SURG

## 2021-09-07 RX ORDER — MEPERIDINE HYDROCHLORIDE 25 MG/ML
12.5 INJECTION INTRAMUSCULAR; INTRAVENOUS; SUBCUTANEOUS
Status: DISCONTINUED | OUTPATIENT
Start: 2021-09-07 | End: 2021-09-07 | Stop reason: HOSPADM

## 2021-09-07 RX ORDER — SODIUM CHLORIDE, SODIUM LACTATE, POTASSIUM CHLORIDE, CALCIUM CHLORIDE 600; 310; 30; 20 MG/100ML; MG/100ML; MG/100ML; MG/100ML
9 INJECTION, SOLUTION INTRAVENOUS CONTINUOUS PRN
Status: DISCONTINUED | OUTPATIENT
Start: 2021-09-07 | End: 2021-09-07 | Stop reason: HOSPADM

## 2021-09-07 RX ORDER — ACETAMINOPHEN 500 MG
1000 TABLET ORAL ONCE
Status: COMPLETED | OUTPATIENT
Start: 2021-09-07 | End: 2021-09-07

## 2021-09-07 RX ORDER — GINSENG 100 MG
CAPSULE ORAL AS NEEDED
Status: DISCONTINUED | OUTPATIENT
Start: 2021-09-07 | End: 2021-09-07 | Stop reason: HOSPADM

## 2021-09-07 RX ORDER — PROMETHAZINE HYDROCHLORIDE 12.5 MG/1
25 TABLET ORAL ONCE AS NEEDED
Status: DISCONTINUED | OUTPATIENT
Start: 2021-09-07 | End: 2021-09-07 | Stop reason: HOSPADM

## 2021-09-07 RX ADMIN — CEFAZOLIN SODIUM 2 G: 1 INJECTION, POWDER, FOR SOLUTION INTRAMUSCULAR; INTRAVENOUS at 10:41

## 2021-09-07 RX ADMIN — DEXAMETHASONE SODIUM PHOSPHATE 20 MG: 4 INJECTION INTRA-ARTICULAR; INTRALESIONAL; INTRAMUSCULAR; INTRAVENOUS; SOFT TISSUE at 11:00

## 2021-09-07 RX ADMIN — OXYCODONE HYDROCHLORIDE 5 MG: 5 TABLET ORAL at 13:29

## 2021-09-07 RX ADMIN — Medication 100 MCG: at 11:04

## 2021-09-07 RX ADMIN — ACETAMINOPHEN 1000 MG: 500 TABLET ORAL at 08:30

## 2021-09-07 RX ADMIN — FENTANYL CITRATE 100 MCG: 50 INJECTION INTRAMUSCULAR; INTRAVENOUS at 10:26

## 2021-09-07 RX ADMIN — Medication 200 MCG: at 12:05

## 2021-09-07 RX ADMIN — PROPOFOL 200 MG: 10 INJECTION, EMULSION INTRAVENOUS at 10:26

## 2021-09-07 RX ADMIN — MIDAZOLAM HYDROCHLORIDE 2 MG: 1 INJECTION, SOLUTION INTRAMUSCULAR; INTRAVENOUS at 09:54

## 2021-09-07 RX ADMIN — SODIUM CHLORIDE, POTASSIUM CHLORIDE, SODIUM LACTATE AND CALCIUM CHLORIDE: 600; 310; 30; 20 INJECTION, SOLUTION INTRAVENOUS at 11:20

## 2021-09-07 RX ADMIN — Medication 100 MCG: at 11:24

## 2021-09-07 RX ADMIN — HYDROMORPHONE HYDROCHLORIDE 0.5 MG: 1 INJECTION, SOLUTION INTRAMUSCULAR; INTRAVENOUS; SUBCUTANEOUS at 13:41

## 2021-09-07 RX ADMIN — Medication 100 MCG: at 11:19

## 2021-09-07 RX ADMIN — SODIUM CHLORIDE, POTASSIUM CHLORIDE, SODIUM LACTATE AND CALCIUM CHLORIDE 9 ML/HR: 600; 310; 30; 20 INJECTION, SOLUTION INTRAVENOUS at 08:28

## 2021-09-07 RX ADMIN — LIDOCAINE HYDROCHLORIDE 40 MG: 20 INJECTION, SOLUTION INFILTRATION; PERINEURAL at 10:26

## 2021-09-07 RX ADMIN — Medication 100 MCG: at 11:35

## 2021-09-07 RX ADMIN — Medication 100 MG: at 10:26

## 2021-09-07 RX ADMIN — Medication 100 MCG: at 11:52

## 2021-09-07 NOTE — ANESTHESIA POSTPROCEDURE EVALUATION
Patient: Jaime Ruano    Procedure Summary     Date: 09/07/21 Room / Location: [facility] OR 03 / [facility] MAIN OR    Anesthesia Start: 1023 Anesthesia Stop: 1243    Procedure: RIGHT THYROIDECTOMY WITH ISTHMUSECTOMY (Right Neck) Diagnosis:       Follicular neoplasm of thyroid      (Follicular neoplasm of thyroid [D49.7])    Surgeons: Guilherme Haskins MD Provider: Omid Arias MD    Anesthesia Type: general ASA Status: 2          Anesthesia Type: general    Vitals  Vitals Value Taken Time   /79 09/07/21 1323   Temp 35.9 °C (96.7 °F) 09/07/21 1243   Pulse 72 09/07/21 1331   Resp 20 09/07/21 1323   SpO2 97 % 09/07/21 1331   Vitals shown include unvalidated device data.        Post Anesthesia Care and Evaluation    Patient location during evaluation: bedside  Patient participation: complete - patient participated  Level of consciousness: awake  Pain management: adequate  Airway patency: patent  Anesthetic complications: No anesthetic complications  PONV Status: none  Cardiovascular status: acceptable and stable  Respiratory status: acceptable and room air  Hydration status: acceptable    Comments: An Anesthesiologist personally participated in the most demanding procedures (including induction and emergence if applicable) in the anesthesia plan, monitored the course of anesthesia administration at frequent intervals and remained physically present and available for immediate diagnosis and treatment of emergencies.

## 2021-09-07 NOTE — H&P
" [facility]   HISTORY AND PHYSICAL    Patient Name: Jaime Ruano  : 1965  MRN: [id number]  Primary Care Physician:  Allen Rivera MD  Date of admission: 2021    Subjective   Subjective     Chief Complaint: \"I am ready.\"    HPI:    Jaime Ruano is a 56 y.o. male with past medical history significant for multiple right-sided thyroid nodules.  He underwent an ultrasound-guided FNA of 2 of his 3 right-sided nodules on 2021 which revealed the right superior nodule to be suspicious for follicular neoplasm ([place] category 4) and the right inferior to be consistent with a follicular lesion of undetermined significance ([place] category 3).  At that time, he elected to pursue right thyroid lobectomy and isthmusectomy.  He has not had any changes to his medical history since his last appointment.      Review of Systems   The following systems were reviewed and negative;  constitution, eyes, ENT, respiratory, cardiovascular, gastrointestinal, genitourinary, integument, hematologic / lymphatic, musculoskeletal, neurological, behavioral/psych, endocrine, and allergies / immunologic.    Personal History     Past Medical History:   Diagnosis Date   • Anesthesia complication     takes long time to wake up... trouble voiding after surgery    • Colovesical fistula 2020   • Diverticulosis    • DVT, lower extremity (CMS/HCC)    • Eczema    • Factor V Leiden (CMS/HCC)    • PE (pulmonary thromboembolism) (CMS/HCC)    • Thyroid nodule        Past Surgical History:   Procedure Laterality Date   • APPENDECTOMY     • COLON SURGERY     • COLON SURGERY  2020    sigmoid colectomy with fistula repair    • COLONOSCOPY  2010   • COLOVESICAL FISTULA REPAIR LAPAROSCOPIC     • US GUIDED FINE NEEDLE ASPIRATION  2019   • US GUIDED FINE NEEDLE ASPIRATION  2021   • VASECTOMY         Family History: family history includes Cancer in his sister. Otherwise pertinent FHx was reviewed and not " pertinent to current issue.    Social History:  reports that he has never smoked. He has never used smokeless tobacco. He reports current alcohol use. He reports that he does not use drugs.    Home Medications:  aspirin and imiquimod      Allergies:  Allergies   Allergen Reactions   • Amoxicillin GI Intolerance     Diarrhea    • Heparin Itching     Flush feeling   • Tetracyclines & Related Other (See Comments)     Reaction unknown       Objective   Objective     Vitals:   Temp:  [97.3 °F (36.3 °C)] 97.3 °F (36.3 °C)  Heart Rate:  [56] 56  Resp:  [16] 16  BP: (105)/(69) 105/69  Physical Exam   General: Well developed, well nourished patient of stated age in no acute distress. Voice is strong and clear.   Head: Normocephalic and atraumatic.   Face: No lesions. House-Brackmann I/VI bilaterally.   Eyes: PERRLA, sclerae anicteric, no conjunctival injection. Extra ocular movements are intact and full.   Ears: Auricles are normal in appearance. Bilateral external auditory canals are unremarkable. Bilateral tympanic membranes are clear and without effusion.   Nose: External nose is normal in appearance. Bilateral nares are patent with normal appearing mucosa. Septum midline. Turbinates are unremarkable. No lesions.   Oral Cavity: Lips are normal in appearance. Oral mucosa is unremarkable. Gingiva is unremarkable. Normal dentition for age. Tongue is unremarkable with good movement. Hard palate is unremarkable.   Oropharynx: Soft palate is unremarkable with full movement. Uvula is unremarkable. Bilateral tonsils are unremarkable. Posterior oropharynx is unremarkable.    Neck: Supple, no thyromegaly, no lymphadenopathy, trachea midline   Respiratory: Clear to auscultation bilaterally, nonlabored respirations    Cardiovascular: RRR, no murmurs, rubs, or gallops, palpable pedal pulses bilaterally   Psychiatric: Appropriate affect, cooperative   Neurologic: Oriented x 3, strength symmetric in all extremities, Cranial Nerves  II-XII are grossly intact to confrontation   Skin: No rashes     Result Review    Result Review:  I have personally reviewed the results from the time of this admission to 9/7/2021 08:18 EDT and agree with these findings:  []  Laboratory  []  Microbiology  []  Radiology  []  EKG/Telemetry   []  Cardiology/Vascular   []  Pathology  []  Old records  []  Other    Assessment/Plan   Assessment / Plan     Brief Patient Summary:  Jaime Ruano is a 56 y.o. male with right-sided thyroid nodule suspicious for follicular neoplasm ([place] category 4).    Active Hospital Problems:  Active Hospital Problems    Diagnosis    • **Follicular neoplasm of thyroid      Added automatically from request for surgery [id number]         Plan: We again reviewed the risks, benefits, and alternatives.  He would like to proceed with right thyroid lobectomy and isthmusectomy.      CODE STATUS:         Electronically signed by Guilherme Haskins MD, 09/07/21, 8:18 AM EDT.

## 2021-09-07 NOTE — OP NOTE
THYROIDECTOMY  Procedure Report    Patient Name:  Jaime Ruano  YOB: 1965    Date of Surgery:  9/7/2021     Indications: 1.  Thyroid nodules suspicious for follicular neoplasm ([place] category 4), right.    Pre-op Diagnosis:   Follicular neoplasm of thyroid [D49.7]       Post-Op Diagnosis Codes:     * Follicular neoplasm of thyroid [D49.7]    Procedure/CPT® Codes:      Procedure(s):  RIGHT THYROIDECTOMY WITH ISTHMUSECTOMY    Staff:  Surgeon(s):  Guilherme Haskins MD         Anesthesia: General    Estimated Blood Loss: 5 mL    Implants:    Implant Name Type Inv. Item Serial No.  Lot No. LRB No. Used Action   HEMOST ABS SURGICEL 2X3 - [id number] Implant HEMOST ABS SURGICEL 2X3  [employer]  [employer] J [employer] J [id number] Right 1 Implanted       Specimen:          Specimens     ID Source Type Tests Collected By Collected At Frozen?    A Thyroid Tissue · TISSUE PATHOLOGY EXAM   Guilherme Haskins MD 9/7/21 1158 No    Description: Right Thyroid Lobe with Isthmus              Findings: 1.  Unremarkable right thyroid lobe.  2.  Right recurrent laryngeal nerve intact and stimulating at 1 mA at the end of the case.    Complications: None    Description of Procedure:   The patient was brought back to the operating room and placed supine upon the table. General endotracheal anesthesia was successfully established. A planned 5 cm incision in a neck crease just below the cricoid cartilage was marked with a marking pen and infiltrated with 7 mL of 1% lidocaine with 1:100,000 epinephrine. The NIMS grounding electrodes were placed and the nerve monitor calibrated and tested. The patient's neck was then prepped and draped in the usual sterile fashion.    The incision was created through the skin with a #15 blade and carried down through the platysma with Bovie cautery. Dissection was then carried down to the strap muscles which were divided in the median raphae. The strap muscles were then  bluntly dissected off of the right anterior thyroid capsule laterally. The cricoid cartilage was located and exposed bluntly. The thyroid isthmus was located and blunt dissection along the thyroid capsule was carried to the superior pole. The superior thyroid artery and vein were identified, isolated, and divided with the LigaSure. Dissection was then carried inferiorly and deep along the lateral portion of the thyroid. The middle thyroid vein was  identified, isolated, and divided with the LigaSure. The recurrent laryngeal nerve was identified through blunt dissection and confirmed using the NIMS nerve stimulator. Its path was traced both superiorly and inferiorly taking great care not to put tension on or damage the nerve. The remaining portion of the thyroid lobe including the inferior pole was dissected free taking care to keep the recurrent laryngeal nerve in view prior to dividing any tissue. The inferior thyroid artery and vein were  identified, isolated, and divided with the LigaSure. The remaining thyroid and isthmus was then dissected from the trachea using Bovie cautery and the junction of the isthmus with the right thyroid lobe was divided using a combination of bipolar and Bovie cautery.    The wound was irrigated profusely with sterile saline. Hemostasis was obtained with bipolar cautery. A Valsalva maneuver was performed and hemostasis was checked and confirmed. The recurrent laryngeal nerve was stimulated successfully at 1 mA. Surgicel was placed in the wound bed. The strap muscles were then reapproximated in simple interrupted fashion using 3-0 Vicryl. The deep dermis was then reapproximated in deep simple interrupted fashion using a combination of 3-0 and 4-0 Vicryl. The skin was closed in horizontal mattress fashion using 6-0 Prolene. Antibiotic ointment was applied to the incision. The patient was then returned to the care of anesthesia. The patient tolerated the procedure well and was  transferred to the recovery room in satisfactory condition and without apparent complication.          Guilherme Haskins MD     Date: 9/7/2021  Time: 13:06 EDT

## 2021-09-07 NOTE — ANESTHESIA PREPROCEDURE EVALUATION
Anesthesia Evaluation     Patient summary reviewed and Nursing notes reviewed   no history of anesthetic complications:  NPO Solid Status: > 8 hours  NPO Liquid Status: > 2 hours           Airway   Dental      Pulmonary - normal exam   (+) pulmonary embolism,   Cardiovascular - normal exam  Exercise tolerance: good (4-7 METS)    (+) DVT,       Neuro/Psych- negative ROS  GI/Hepatic/Renal/Endo - negative ROS     Musculoskeletal (-) negative ROS    Abdominal  - normal exam   Substance History - negative use     OB/GYN negative ob/gyn ROS         Other - negative ROS       ROS/Med Hx Other: Very slow on awakening, atypical reaction to lots of medications                Anesthesia Plan    ASA 2     general   (Patient understands anesthesia not responsible for dental damage.)  intravenous induction     Anesthetic plan, all risks, benefits, and alternatives have been provided, discussed and informed consent has been obtained with: patient.  Use of blood products discussed with patient .   Plan discussed with CRNA.

## 2021-09-10 LAB
CYTO UR: NORMAL
LAB AP CASE REPORT: NORMAL
LAB AP CLINICAL INFORMATION: NORMAL
PATH REPORT.FINAL DX SPEC: NORMAL
PATH REPORT.GROSS SPEC: NORMAL

## 2021-09-15 ENCOUNTER — OFFICE VISIT (OUTPATIENT)
Dept: OTOLARYNGOLOGY | Facility: CLINIC | Age: 56
End: 2021-09-15

## 2021-09-15 VITALS — TEMPERATURE: 96.9 F | HEIGHT: 67 IN | BODY MASS INDEX: 29.16 KG/M2 | WEIGHT: 185.8 LBS

## 2021-09-15 DIAGNOSIS — E04.1 THYROID NODULE: Primary | ICD-10-CM

## 2021-09-15 PROCEDURE — 99024 POSTOP FOLLOW-UP VISIT: CPT | Performed by: OTOLARYNGOLOGY

## 2021-09-15 NOTE — PROGRESS NOTES
Patient Name: Jaime Ruano   Visit Date: 09/15/2021   Patient ID: [id number]  Provider: Guilherme Haskins MD    Sex: male  Location: [facility]   YOB: 1965  Location Address: [address] [place] [address], [address] [place] [address],?[place]?[id number]    Primary Care Provider Allen Rivera MD  Location Phone: [phone]    Referring Provider: No ref. provider found        Chief Complaint  Post-op (1 week)    History of Present Illness  Jaime Ruano is a 56 y.o. male with past medical history significant for multiple right-sided thyroid nodules. He previously underwent an ultrasound-guided FNA of a right 1.7 cm nodule which was consistent with atypia of undetermined significance. Based on those 3/29/2021 thyroid ultrasound we discussed undergoing FNA of 2 of the 3 right-sided nodules. This was performed on 4/12/2021 and revealed the right superior nodule to be suspicious for follicular neoplasm ([place] category 4) in the right inferior nodule to be consistent with follicular lesion of undetermined significance ([place] category 3).     He returns today for follow-up status post right thyroid lobectomy and isthmusectomy on 9/7/2021.  Final pathology revealed a follicular adenoma and nodular hyperplasia. Soreness is improving.  He does report some fatigue.  He has not had any issues with fevers or chills.      Past Medical History:   Diagnosis Date   • Anesthesia complication     takes long time to wake up... trouble voiding after surgery    • Colovesical fistula 03/18/2020   • Diverticulosis    • DVT, lower extremity (CMS/HCC)    • Eczema    • Factor V Leiden (CMS/HCC)    • PE (pulmonary thromboembolism) (CMS/HCC)    • Thyroid nodule        Past Surgical History:   Procedure Laterality Date   • APPENDECTOMY     • COLON SURGERY     • COLON SURGERY  03/29/2020    sigmoid colectomy with fistula repair    • COLONOSCOPY  2010 2018   • COLOVESICAL FISTULA REPAIR LAPAROSCOPIC     • THYROIDECTOMY Right  "9/7/2021    Procedure: RIGHT THYROIDECTOMY WITH ISTHMUSECTOMY;  Surgeon: Guilherme Haskins MD;  Location: [facility] MAIN OR;  Service: ENT;  Laterality: Right;   • US GUIDED FINE NEEDLE ASPIRATION  2/6/2019   • US GUIDED FINE NEEDLE ASPIRATION  4/12/2021   • VASECTOMY           Current Outpatient Medications:   •  aspirin (aspirin) 81 MG EC tablet, Take 81 mg by mouth Daily. To stop 3 days prior to surgery, Disp: , Rfl:   •  imiquimod (ALDARA) 5 % cream, , Disp: , Rfl:      Allergies   Allergen Reactions   • Amoxicillin GI Intolerance     Diarrhea    • Heparin Itching     Flush feeling   • Tetracyclines & Related Other (See Comments)     Reaction unknown       Social History     Tobacco Use   • Smoking status: Never Smoker   • Smokeless tobacco: Never Used   Vaping Use   • Vaping Use: Never used   Substance Use Topics   • Alcohol use: Yes     Comment: current some day    • Drug use: Never        Objective     Vital Signs:   Temp 96.9 °F (36.1 °C) (Temporal)   Ht 170.2 cm (67\")   Wt 84.3 kg (185 lb 12.8 oz)   BMI 29.10 kg/m²       Physical Exam    General: Well developed, well nourished patient of stated age in no acute distress. Voice is strong and clear.   Head: Normocephalic and atraumatic.  Face: No lesions.  Bilateral parotid and submandibular glands are unremarkable.  Stensen's and Warthin's ducts are productive of clear saliva bilaterally.  House-Brackmann I/VI     bilaterally.   muscles and temporomandibular joint nontender to palpation.  No TMJ crepitus.  Eyes: PERRLA, sclerae anicteric, no conjunctival injection. Extra ocular movements are intact and full. No nystagmus.   Ears: Auricles are normal in appearance. Bilateral external auditory canals are unremarkable. Bilateral tympanic membranes are clear and without effusion. Hearing normal to conversational voice.   Nose: External nose is normal in appearance. Bilateral nares are patent with normal appearing mucosa. Septum midline. " Turbinates are unremarkable. No lesions.   Oral Cavity: Lips are normal in appearance. Oral mucosa is unremarkable. Gingiva is unremarkable. Normal dentition for age. Tongue is unremarkable with good movement. Hard palate is unremarkable.   Oropharynx: Soft palate is unremarkable with full movement. Uvula is unremarkable. Bilateral tonsils are unremarkable. Posterior oropharynx is unremarkable.    Larynx and hypopharynx: Deferred secondary to gag reflex.  Neck: Supple.  No mass.  Nontender to palpation.  Trachea midline.  Right thyroid lobectomy incision is clean, dry, and intact with sutures in place.  Prolene sutures were removed.   Lymphatic: No lymphadenopathy upon palpation.  Respiratory: Clear to auscultation bilaterally, nonlabored respirations    Cardiovascular: RRR, no murmurs, rubs, or gallops,   Psychiatric: Appropriate affect, cooperative   Neurologic: Oriented x 3, strength symmetric in all extremities, Cranial Nerves II-XII are grossly intact to confrontation   Skin: Warm and dry. No rashes.    Procedures           Result Review :               Assessment and Plan    Diagnoses and all orders for this visit:    1. Thyroid nodule (Primary)  -     T4 & TSH ([facility]); Future    He is healing well status post right thyroid lobectomy and isthmusectomy on 9/7/2021.  We reviewed his final pathology which was benign.  His sutures were removed today in clinic and we discussed continued postoperative care.  He will follow up in 5 weeks with a preappointment TSH and free T4 testing or sooner if needed.        Follow Up   Return in about 5 weeks (around 10/20/2021).  Patient was given instructions and counseling regarding his condition or for health maintenance advice. Please see specific information pulled into the AVS if appropriate.

## 2021-10-28 ENCOUNTER — LAB (OUTPATIENT)
Dept: LAB | Facility: HOSPITAL | Age: 56
End: 2021-10-28

## 2021-10-28 ENCOUNTER — OFFICE VISIT (OUTPATIENT)
Dept: OTOLARYNGOLOGY | Facility: CLINIC | Age: 56
End: 2021-10-28

## 2021-10-28 VITALS — HEIGHT: 67 IN | BODY MASS INDEX: 28.72 KG/M2 | TEMPERATURE: 97.3 F | WEIGHT: 183 LBS

## 2021-10-28 DIAGNOSIS — E04.1 THYROID NODULE: ICD-10-CM

## 2021-10-28 DIAGNOSIS — E04.1 THYROID NODULE: Primary | ICD-10-CM

## 2021-10-28 PROBLEM — R07.89 OTHER CHEST PAIN: Status: ACTIVE | Noted: 2018-03-06

## 2021-10-28 PROBLEM — I82.409 DVT (DEEP VENOUS THROMBOSIS): Status: ACTIVE | Noted: 2021-10-28

## 2021-10-28 PROBLEM — K57.90 DIVERTICULOSIS: Status: ACTIVE | Noted: 2021-10-28

## 2021-10-28 PROBLEM — D68.51 FACTOR V LEIDEN: Status: ACTIVE | Noted: 2021-10-28

## 2021-10-28 PROBLEM — R94.31 ABNORMAL ECG: Status: ACTIVE | Noted: 2018-03-06

## 2021-10-28 PROBLEM — N32.1 COLOVESICAL FISTULA: Status: ACTIVE | Noted: 2020-03-18

## 2021-10-28 LAB
T4 FREE SERPL-MCNC: 1.07 NG/DL (ref 0.93–1.7)
TSH SERPL DL<=0.05 MIU/L-ACNC: 3 UIU/ML (ref 0.27–4.2)

## 2021-10-28 PROCEDURE — 36415 COLL VENOUS BLD VENIPUNCTURE: CPT

## 2021-10-28 PROCEDURE — 99024 POSTOP FOLLOW-UP VISIT: CPT | Performed by: OTOLARYNGOLOGY

## 2021-10-28 PROCEDURE — 84443 ASSAY THYROID STIM HORMONE: CPT

## 2021-10-28 PROCEDURE — 84439 ASSAY OF FREE THYROXINE: CPT

## 2021-10-28 NOTE — PROGRESS NOTES
Patient Name: Jaime Ruano   Visit Date: 10/28/2021   Patient ID: [id number]  Provider: Guilherme Haskins MD    Sex: male  Location: [facility]   YOB: 1965  Location Address: [address] [place] [address], [address] [place] [address],?[place]?[id number]    Primary Care Provider Allen Rivera MD  Location Phone: [phone]    Referring Provider: No ref. provider found        Chief Complaint  No chief complaint on file.    History of Present Illness  Jaime Ruano is a 56 y.o. male with past medical history significant for multiple right-sided thyroid nodules. He previously underwent an ultrasound-guided FNA of a right 1.7 cm nodule which was consistent with atypia of undetermined significance. Based on those 3/29/2021 thyroid ultrasound we discussed undergoing FNA of 2 of the 3 right-sided nodules. This was performed on 4/12/2021 and revealed the right superior nodule to be suspicious for follicular neoplasm ([place] category 4) in the right inferior nodule to be consistent with follicular lesion of undetermined significance ([place] category 3).     He returns today for follow-up status post right thyroid lobectomy and isthmusectomy on 9/7/2021.  Final pathology revealed a follicular adenoma and nodular hyperplasia.  He was last seen 9/15/2021 at which time sutures were removed.  He tells me that he has done well since his last appointment.  He is not having any pain in the area of the incision and denies any hoarseness or dysphagia.  He has not had any issues with fatigue or cold intolerance.  We will have his TSH and free T4 level drawn today.      Past Medical History:   Diagnosis Date   • Anesthesia complication     takes long time to wake up... trouble voiding after surgery    • Colovesical fistula 03/18/2020   • Diverticulosis    • DVT, lower extremity (CMS/HCC)    • Eczema    • Factor V Leiden (CMS/HCC)    • PE (pulmonary thromboembolism) (CMS/HCC)    • Thyroid nodule        Past Surgical  History:   Procedure Laterality Date   • APPENDECTOMY     • COLON SURGERY     • COLON SURGERY  03/29/2020    sigmoid colectomy with fistula repair    • COLONOSCOPY  2010 2018   • COLOVESICAL FISTULA REPAIR LAPAROSCOPIC     • THYROIDECTOMY Right 9/7/2021    Procedure: RIGHT THYROIDECTOMY WITH ISTHMUSECTOMY;  Surgeon: Guilherme Haskins MD;  Location: [facility] MAIN OR;  Service: ENT;  Laterality: Right;   • US GUIDED FINE NEEDLE ASPIRATION  2/6/2019   • US GUIDED FINE NEEDLE ASPIRATION  4/12/2021   • VASECTOMY           Current Outpatient Medications:   •  aspirin (aspirin) 81 MG EC tablet, Take 81 mg by mouth Daily. To stop 3 days prior to surgery, Disp: , Rfl:   •  imiquimod (ALDARA) 5 % cream, , Disp: , Rfl:      Allergies   Allergen Reactions   • Amoxicillin GI Intolerance     Diarrhea    • Heparin Itching     Flush feeling   • Tetracyclines & Related Other (See Comments)     Reaction unknown       Social History     Tobacco Use   • Smoking status: Never Smoker   • Smokeless tobacco: Never Used   Vaping Use   • Vaping Use: Never used   Substance Use Topics   • Alcohol use: Yes     Comment: current some day    • Drug use: Never        Objective     Vital Signs:   There were no vitals taken for this visit.      Physical Exam    General: Well developed, well nourished patient of stated age in no acute distress. Voice is strong and clear.   Head: Normocephalic and atraumatic.  Face: No lesions.  Bilateral parotid and submandibular glands are unremarkable.  Stensen's and Warthin's ducts are productive of clear saliva bilaterally.  House-Brackmann I/VI     bilaterally.   muscles and temporomandibular joint nontender to palpation.  No TMJ crepitus.  Eyes: PERRLA, sclerae anicteric, no conjunctival injection. Extra ocular movements are intact and full. No nystagmus.   Ears: Auricles are normal in appearance. Bilateral external auditory canals are unremarkable. Bilateral tympanic membranes are clear and  without effusion. Hearing normal to conversational voice.   Nose: External nose is normal in appearance. Bilateral nares are patent with normal appearing mucosa. Septum midline. Turbinates are unremarkable. No lesions.   Oral Cavity: Lips are normal in appearance. Oral mucosa is unremarkable. Gingiva is unremarkable. Normal dentition for age. Tongue is unremarkable with good movement. Hard palate is unremarkable.   Oropharynx: Soft palate is unremarkable with full movement. Uvula is unremarkable. Bilateral tonsils are unremarkable. Posterior oropharynx is unremarkable.    Larynx and hypopharynx: Deferred secondary to gag reflex.  Neck: Supple.  No mass.  Nontender to palpation.  Trachea midline.  Right thyroid lobectomy incisional scar is flat.  Prolene sutures were removed.   Lymphatic: No lymphadenopathy upon palpation.  Respiratory: Clear to auscultation bilaterally, nonlabored respirations    Cardiovascular: RRR, no murmurs, rubs, or gallops,   Psychiatric: Appropriate affect, cooperative   Neurologic: Oriented x 3, strength symmetric in all extremities, Cranial Nerves II-XII are grossly intact to confrontation   Skin: Warm and dry. No rashes.    Procedures           Result Review :               Assessment and Plan    There are no diagnoses linked to this encounter.He is well-healed status post right thyroid lobectomy and isthmusectomy on 9/7/2021.  We again reviewed his final pathology which was benign.  He does have some small subcentimeter nodules on the left lobe for which we will follow up with an ultrasound in 1 year.  We will see about getting his TSH and free T4 levels drawn today to make sure he does not need to be on any levothyroxine.  He was given ample time to ask questions, all of which were answered to satisfaction.      Follow Up   No follow-ups on file.  Patient was given instructions and counseling regarding his condition or for health maintenance advice. Please see specific information pulled  into the AVS if appropriate.

## 2021-11-27 ENCOUNTER — APPOINTMENT (OUTPATIENT)
Dept: GENERAL RADIOLOGY | Facility: HOSPITAL | Age: 56
End: 2021-11-27

## 2021-11-27 ENCOUNTER — APPOINTMENT (OUTPATIENT)
Dept: CT IMAGING | Facility: HOSPITAL | Age: 56
End: 2021-11-27

## 2021-11-27 ENCOUNTER — HOSPITAL ENCOUNTER (EMERGENCY)
Facility: HOSPITAL | Age: 56
Discharge: HOME | End: 2021-11-27
Attending: EMERGENCY MEDICINE | Admitting: EMERGENCY MEDICINE

## 2021-11-27 VITALS
WEIGHT: 182.1 LBS | BODY MASS INDEX: 28.58 KG/M2 | SYSTOLIC BLOOD PRESSURE: 128 MMHG | DIASTOLIC BLOOD PRESSURE: 79 MMHG | OXYGEN SATURATION: 96 % | HEART RATE: 80 BPM | HEIGHT: 67 IN | RESPIRATION RATE: 18 BRPM | TEMPERATURE: 97.8 F

## 2021-11-27 DIAGNOSIS — U07.1 COVID-19 VIRUS DETECTED: Primary | ICD-10-CM

## 2021-11-27 LAB
ALBUMIN SERPL-MCNC: 4.1 G/DL (ref 3.5–5.2)
ALBUMIN/GLOB SERPL: 1.4 G/DL
ALP SERPL-CCNC: 65 U/L (ref 39–117)
ALT SERPL W P-5'-P-CCNC: 30 U/L (ref 1–41)
ANION GAP SERPL CALCULATED.3IONS-SCNC: 11.9 MMOL/L (ref 5–15)
ANISOCYTOSIS BLD QL: NORMAL
AST SERPL-CCNC: 29 U/L (ref 1–40)
BASOPHILS # BLD AUTO: 0.04 10*3/MM3 (ref 0–0.2)
BASOPHILS NFR BLD AUTO: 0.7 % (ref 0–1.5)
BILIRUB SERPL-MCNC: 0.7 MG/DL (ref 0–1.2)
BUN SERPL-MCNC: 13 MG/DL (ref 6–20)
BUN/CREAT SERPL: 11.6 (ref 7–25)
CALCIUM SPEC-SCNC: 8.3 MG/DL (ref 8.6–10.5)
CHLORIDE SERPL-SCNC: 99 MMOL/L (ref 98–107)
CO2 SERPL-SCNC: 27.1 MMOL/L (ref 22–29)
CREAT SERPL-MCNC: 1.12 MG/DL (ref 0.76–1.27)
DEPRECATED RDW RBC AUTO: 44.2 FL (ref 37–54)
EOSINOPHIL # BLD AUTO: 0.01 10*3/MM3 (ref 0–0.4)
EOSINOPHIL NFR BLD AUTO: 0.2 % (ref 0.3–6.2)
ERYTHROCYTE [DISTWIDTH] IN BLOOD BY AUTOMATED COUNT: 12.8 % (ref 12.3–15.4)
GFR SERPL CREATININE-BSD FRML MDRD: 67 ML/MIN/1.73
GLOBULIN UR ELPH-MCNC: 2.9 GM/DL
GLUCOSE SERPL-MCNC: 109 MG/DL (ref 65–99)
HCT VFR BLD AUTO: 44.8 % (ref 37.5–51)
HGB BLD-MCNC: 15.7 G/DL (ref 13–17.7)
HOLD SPECIMEN: NORMAL
HOLD SPECIMEN: NORMAL
IMM GRANULOCYTES # BLD AUTO: 0.03 10*3/MM3 (ref 0–0.05)
IMM GRANULOCYTES NFR BLD AUTO: 0.5 % (ref 0–0.5)
LYMPHOCYTES # BLD AUTO: 1.58 10*3/MM3 (ref 0.7–3.1)
LYMPHOCYTES NFR BLD AUTO: 26.8 % (ref 19.6–45.3)
MCH RBC QN AUTO: 32.7 PG (ref 26.6–33)
MCHC RBC AUTO-ENTMCNC: 35 G/DL (ref 31.5–35.7)
MCV RBC AUTO: 93.3 FL (ref 79–97)
MICROCYTES BLD QL: NORMAL
MONOCYTES # BLD AUTO: 1.26 10*3/MM3 (ref 0.1–0.9)
MONOCYTES NFR BLD AUTO: 21.4 % (ref 5–12)
NEUTROPHILS NFR BLD AUTO: 2.98 10*3/MM3 (ref 1.7–7)
NEUTROPHILS NFR BLD AUTO: 50.4 % (ref 42.7–76)
NRBC BLD AUTO-RTO: 0 /100 WBC (ref 0–0.2)
NT-PROBNP SERPL-MCNC: 19.9 PG/ML (ref 0–900)
PLATELET # BLD AUTO: 156 10*3/MM3 (ref 140–450)
PMV BLD AUTO: 9.7 FL (ref 6–12)
POTASSIUM SERPL-SCNC: 3.7 MMOL/L (ref 3.5–5.2)
PROT SERPL-MCNC: 7 G/DL (ref 6–8.5)
RBC # BLD AUTO: 4.8 10*6/MM3 (ref 4.14–5.8)
SMALL PLATELETS BLD QL SMEAR: ADEQUATE
SODIUM SERPL-SCNC: 138 MMOL/L (ref 136–145)
TROPONIN T SERPL-MCNC: <0.01 NG/ML (ref 0–0.03)
WBC MORPH BLD: NORMAL
WBC NRBC COR # BLD: 5.9 10*3/MM3 (ref 3.4–10.8)
WHOLE BLOOD HOLD SPECIMEN: NORMAL
WHOLE BLOOD HOLD SPECIMEN: NORMAL

## 2021-11-27 PROCEDURE — 83880 ASSAY OF NATRIURETIC PEPTIDE: CPT | Performed by: EMERGENCY MEDICINE

## 2021-11-27 PROCEDURE — 80053 COMPREHEN METABOLIC PANEL: CPT | Performed by: EMERGENCY MEDICINE

## 2021-11-27 PROCEDURE — 99284 EMERGENCY DEPT VISIT MOD MDM: CPT

## 2021-11-27 PROCEDURE — M0243 CASIRIVI AND IMDEVI INFUSION: HCPCS | Performed by: NURSE PRACTITIONER

## 2021-11-27 PROCEDURE — 25010000002 INJECTION, CASIRIVIMAB AND IMDEVIMAB, 1200 MG: Performed by: NURSE PRACTITIONER

## 2021-11-27 PROCEDURE — 84484 ASSAY OF TROPONIN QUANT: CPT | Performed by: EMERGENCY MEDICINE

## 2021-11-27 PROCEDURE — 93005 ELECTROCARDIOGRAM TRACING: CPT

## 2021-11-27 PROCEDURE — 71275 CT ANGIOGRAPHY CHEST: CPT

## 2021-11-27 PROCEDURE — 85007 BL SMEAR W/DIFF WBC COUNT: CPT | Performed by: EMERGENCY MEDICINE

## 2021-11-27 PROCEDURE — 85025 COMPLETE CBC W/AUTO DIFF WBC: CPT | Performed by: EMERGENCY MEDICINE

## 2021-11-27 PROCEDURE — 93005 ELECTROCARDIOGRAM TRACING: CPT | Performed by: EMERGENCY MEDICINE

## 2021-11-27 PROCEDURE — 36415 COLL VENOUS BLD VENIPUNCTURE: CPT

## 2021-11-27 PROCEDURE — 0 IOPAMIDOL PER 1 ML: Performed by: EMERGENCY MEDICINE

## 2021-11-27 PROCEDURE — 93010 ELECTROCARDIOGRAM REPORT: CPT | Performed by: INTERNAL MEDICINE

## 2021-11-27 RX ORDER — DIPHENHYDRAMINE HYDROCHLORIDE 50 MG/ML
50 INJECTION INTRAMUSCULAR; INTRAVENOUS ONCE AS NEEDED
Status: DISCONTINUED | OUTPATIENT
Start: 2021-11-27 | End: 2021-11-27 | Stop reason: HOSPADM

## 2021-11-27 RX ORDER — SODIUM CHLORIDE 9 MG/ML
30 INJECTION, SOLUTION INTRAVENOUS ONCE
Status: DISCONTINUED | OUTPATIENT
Start: 2021-11-27 | End: 2021-11-27 | Stop reason: HOSPADM

## 2021-11-27 RX ORDER — METHYLPREDNISOLONE SODIUM SUCCINATE 125 MG/2ML
125 INJECTION, POWDER, LYOPHILIZED, FOR SOLUTION INTRAMUSCULAR; INTRAVENOUS ONCE AS NEEDED
Status: DISCONTINUED | OUTPATIENT
Start: 2021-11-27 | End: 2021-11-27 | Stop reason: HOSPADM

## 2021-11-27 RX ORDER — EPINEPHRINE 1 MG/ML
0.3 INJECTION, SOLUTION, CONCENTRATE INTRAVENOUS ONCE AS NEEDED
Status: DISCONTINUED | OUTPATIENT
Start: 2021-11-27 | End: 2021-11-27 | Stop reason: HOSPADM

## 2021-11-27 RX ORDER — SODIUM CHLORIDE 0.9 % (FLUSH) 0.9 %
10 SYRINGE (ML) INJECTION AS NEEDED
Status: DISCONTINUED | OUTPATIENT
Start: 2021-11-27 | End: 2021-11-27 | Stop reason: HOSPADM

## 2021-11-27 RX ORDER — DIPHENHYDRAMINE HCL 50 MG
50 CAPSULE ORAL ONCE AS NEEDED
Status: DISCONTINUED | OUTPATIENT
Start: 2021-11-27 | End: 2021-11-27 | Stop reason: HOSPADM

## 2021-11-27 RX ADMIN — IOPAMIDOL 100 ML: 755 INJECTION, SOLUTION INTRAVENOUS at 15:20

## 2021-11-27 RX ADMIN — SODIUM CHLORIDE 1000 ML: 9 INJECTION, SOLUTION INTRAVENOUS at 14:58

## 2021-11-27 RX ADMIN — IMDEVIMAB: 300 INJECTION, SOLUTION, CONCENTRATE INTRAVENOUS at 16:31

## 2021-11-29 ENCOUNTER — TRANSCRIBE ORDERS (OUTPATIENT)
Dept: ADMINISTRATIVE | Facility: HOSPITAL | Age: 56
End: 2021-11-29

## 2021-11-29 DIAGNOSIS — U07.1 COVID-19: Primary | ICD-10-CM

## 2021-12-06 LAB — QT INTERVAL: 408 MS

## 2022-05-20 ENCOUNTER — OFFICE VISIT (OUTPATIENT)
Dept: SLEEP MEDICINE | Facility: HOSPITAL | Age: 57
End: 2022-05-20

## 2022-05-20 VITALS
WEIGHT: 181 LBS | OXYGEN SATURATION: 96 % | DIASTOLIC BLOOD PRESSURE: 76 MMHG | HEIGHT: 67 IN | BODY MASS INDEX: 28.41 KG/M2 | HEART RATE: 74 BPM | SYSTOLIC BLOOD PRESSURE: 91 MMHG

## 2022-05-20 DIAGNOSIS — G47.33 OSA (OBSTRUCTIVE SLEEP APNEA): ICD-10-CM

## 2022-05-20 PROCEDURE — G0463 HOSPITAL OUTPT CLINIC VISIT: HCPCS

## 2022-05-20 NOTE — PROGRESS NOTES
[facility]      Patient Care Team:  Allen Rivera MD as PCP - General (Family Medicine)    Referring Provider: Lainey Mauricio APRN    Chief complaint:   Snoring and restless sleep    History of present illness:    Subjective   This is a 56 year old male patient with hx of VTE.     He reported restless sleep with frequent awakenings and also reported loud snoring which awakens him almost every night.  His wife has witnessed him stopping breathing.  He also reported waking up gasping and choking and described dry mouth in the morning.  He has frequent awakening and nonrefreshing sleep.      Sleep schedule:  -Bedtime: 9:15 PM  -Sleep latency: 10 min  -Wake up time: 4:20-8 AM , does not feel refreshed  -Nocturnal awakenin-3 times because of nocturia and snoring.  No difficulties going back to sleep.  -Perceived sleep hours: 8-10 hours      ESS: Total score: 6   He was hospitalized for abdominal surgeries x2 over the last 2 years and was told that he was having breathing disturbance and low oxygen level at night.    Review of Systems  Constitutional: No fever or chills. No changes in appetite.   ENMT: No sinus congestion, postnasal drip, hoarsness  Cardiovascular: No chest pain, palpitation or legs swelling.    Respiratory: No dyspnea, cough or wheezing.  Gastrointestinal: No constipation, diarrhea, abdominal pain or acid reflux  Neurology: No headache, weakness, numbness or dizziness.   Musculoskeletal: No joints pain, stiffness or swelling.   Psychiatry: No depression, anxiety or irritability.   Hem/Lymphatic: No swollen glands or easy bruising.  Integumentary: Rash.  Endocrinology: No excessive thirst, cold or warm intolerance.   Urinary: No dysuria, bloody urine but frequent urination.     History  Past Medical History:   Diagnosis Date   • Anesthesia complication     takes long time to wake up... trouble voiding after surgery    • Colovesical  "fistula 03/18/2020   • Diverticulosis    • DVT, lower extremity (HCC)    • Eczema    • Factor V Leiden (HCC)    • PE (pulmonary thromboembolism) (HCC)    • Pulmonary emboli (HCC)     2018   • Thyroid nodule    ,   Past Surgical History:   Procedure Laterality Date   • APPENDECTOMY     • COLON SURGERY     • COLON SURGERY  03/29/2020    sigmoid colectomy with fistula repair    • COLONOSCOPY  2010 2018   • COLOVESICAL FISTULA REPAIR LAPAROSCOPIC     • THYROIDECTOMY Right 9/7/2021    Procedure: RIGHT THYROIDECTOMY WITH ISTHMUSECTOMY;  Surgeon: Guilherme Haskins MD;  Location: [facility] OR;  Service: ENT;  Laterality: Right;   • US GUIDED FINE NEEDLE ASPIRATION  2/6/2019   • US GUIDED FINE NEEDLE ASPIRATION  4/12/2021   • VASECTOMY     ,   Family History   Problem Relation Age of Onset   • Cancer Sister    ,   Social History     Socioeconomic History   • Marital status:    Tobacco Use   • Smoking status: Never Smoker   • Smokeless tobacco: Never Used   Vaping Use   • Vaping Use: Never used   Substance and Sexual Activity   • Alcohol use: Yes     Comment: occ   • Drug use: Never   • Sexual activity: Defer     E-cigarette/Vaping   • E-cigarette/Vaping Use Never User      E-cigarette/Vaping Substances     E-cigarette/Vaping Devices        and Allergies:  Amoxicillin, Heparin, and Tetracyclines & related    Medications:    Current Outpatient Medications:   •  aspirin (aspirin) 81 MG EC tablet, Take 81 mg by mouth Daily. To stop 3 days prior to surgery, Disp: , Rfl:   •  brompheniramine-pseudoephedrine-DM 30-2-10 MG/5ML syrup, Take 10 mL by mouth 3 (Three) Times a Day As Needed for Congestion or Cough., Disp: 120 mL, Rfl: 0  •  methylPREDNISolone (MEDROL) 4 MG dose pack, , Disp: , Rfl:   •  triamcinolone (KENALOG) 0.1 % ointment, , Disp: , Rfl:       Objective   Vital Signs:  Vitals:    05/20/22 1100   BP: 91/76   Pulse: 74   SpO2: 96%   Weight: 82.1 kg (181 lb)   Height: 170.2 cm (67\")     Body mass index " is 28.35 kg/m².        Physical Exam:        Constitutional: Not in acute distress.  Eyes: Injected conjunctiva, EOMI. pupils equal reactive to light.  ENMT: [place] score 2. Mallampati score 3. No oral thrush. Tonsils grade 1. Narrow distance in between the posterior pharyngeal pillars.  Neck:  No thyromegaly.  Trachea midline.  Heart: Regular rhythm and rate, no murmur  Lungs: Good and equal air entry bilaterally. No crackles or wheezing.  Nonlabored breathing.       Abdomen:  Soft.  No tenderness.  Positive bowel sounds.  Extremities: No cyanosis, clubbing or pitting edema.  Warm extremities and well-perfused.  Neuro: Conscious, alert, oriented x3.  Gait is normal.  Strength 5/5 in arms and legs.  Psych: Appropriate mood and affect.    Integumentary: No rash.  Normal skin turgor.  Lymphatic: No palpable cervical or supraclavicular lymph nodes.    Diagnostic data:    Hemoglobin   Date Value Ref Range Status   11/27/2021 15.7 13.0 - 17.7 g/dL Final     CO2   Date Value Ref Range Status   11/27/2021 27.1 22.0 - 29.0 mmol/L Final       Assessment   1. Snoring: Probable sleep apnea  2. Overweight, BMI 28  3. 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 We discussed the pathophysiology of sleep apnea, testing and therapy which include CPAP and weight loss.  Patient is agreeable with CPAP therapy if needed.    Counseled for weight loss.  Encouraged to exercise regularly and cut down on carbohydrates.  Discussed that losing weight may decrease the severity of sleep apnea and obviate the need of CPAP therapy.    Counseled to get at least 7-8 hours of sleep.        Jacy Jon MD  05/20/22  12:28 EDT    This note was dictated utilizing Dragon dictation

## 2022-06-14 ENCOUNTER — HOSPITAL ENCOUNTER (OUTPATIENT)
Dept: SLEEP MEDICINE | Facility: HOSPITAL | Age: 57
Discharge: HOME | End: 2022-06-14
Admitting: INTERNAL MEDICINE

## 2022-06-14 DIAGNOSIS — G47.33 OSA (OBSTRUCTIVE SLEEP APNEA): ICD-10-CM

## 2022-06-14 PROCEDURE — 95806 SLEEP STUDY UNATT&RESP EFFT: CPT

## 2022-07-01 DIAGNOSIS — G47.33 OSA (OBSTRUCTIVE SLEEP APNEA): Primary | ICD-10-CM

## 2022-07-05 ENCOUNTER — TELEPHONE (OUTPATIENT)
Dept: SLEEP MEDICINE | Facility: HOSPITAL | Age: 57
End: 2022-07-05

## 2022-09-16 ENCOUNTER — OFFICE VISIT (OUTPATIENT)
Dept: SLEEP MEDICINE | Facility: HOSPITAL | Age: 57
End: 2022-09-16

## 2022-09-16 VITALS
SYSTOLIC BLOOD PRESSURE: 117 MMHG | OXYGEN SATURATION: 96 % | BODY MASS INDEX: 28.8 KG/M2 | HEIGHT: 67 IN | HEART RATE: 64 BPM | WEIGHT: 183.5 LBS | DIASTOLIC BLOOD PRESSURE: 70 MMHG

## 2022-09-16 DIAGNOSIS — G47.33 OSA (OBSTRUCTIVE SLEEP APNEA): ICD-10-CM

## 2022-09-16 PROCEDURE — G0463 HOSPITAL OUTPT CLINIC VISIT: HCPCS

## 2022-09-16 RX ORDER — ESZOPICLONE 2 MG/1
2 TABLET, FILM COATED ORAL NIGHTLY
Qty: 30 TABLET | Refills: 1 | Status: SHIPPED | OUTPATIENT
Start: 2022-09-16 | End: 2022-12-22

## 2022-09-16 NOTE — PROGRESS NOTES
[facility]                          Chief Complaint:   Follow up for obstructive sleep apnea    History of present illness:   Subjective     57-year-old male patient with history of DVT.  He complains of middle insomnia, snoring, apnea and gasping for breath.    LUCAS:  HST 6/15/22: JOS= 29/h; Supine JOS= 33.8/h.     He started using CPAP 8/18/22 but he had issues with the mask.  He tried nasal pillow but experienced air leak and could not tolerate a chinstrap.  He switched to FFM and continued to have issues with air leak.  He also reported difficulties exhaling against the machine.    Mask: FFM which does not fit well.  No significant air leak or dry mouth  DME: [employer]    ESS: Total score: 3     Insomnia:  He's having hard time falling asleep with he CPAP. Bedtime: 10 PM  SOL: 1.5 hours now with CPAP.   Wake up time: 4:30 on workdays and 7:30 on the off days.     REVIEW OF SYSTEMS:   HEENT: No nasal congestion or postnasal drip but dry mouth  CARDIOVASCULAR: No chest pain, chest pressure or chest discomfort. No palpitations or edema.   RESPIRATORY:  shortness of breath.  No cough or sputum.   GASTROINTESTINAL: No abdominal bloating or reflux   NEUROLOGICAL/PSYCHOATRY: No depression nor anxiety  Endocrine: No excessive thirst, cold or warm intolerance  Genitourinary: No dysuria or frequent urination    Past Medical History:  Past Medical History:   Diagnosis Date   • Anesthesia complication     takes long time to wake up... trouble voiding after surgery    • Colovesical fistula 03/18/2020   • Diverticulosis    • DVT, lower extremity ([facility])    • Eczema    • Factor V Leiden ([facility])    • PE (pulmonary thromboembolism) ([facility])    • Pulmonary emboli ([facility])     2018   • Thyroid nodule    ,   Past Surgical History:   Procedure Laterality Date   • APPENDECTOMY     • COLON SURGERY     • COLON SURGERY  03/29/2020    sigmoid colectomy with fistula repair    • COLONOSCOPY  2010 2018   •  "COLOVESICAL FISTULA REPAIR LAPAROSCOPIC     • THYROIDECTOMY Right 9/7/2021    Procedure: RIGHT THYROIDECTOMY WITH ISTHMUSECTOMY;  Surgeon: Guilherme Haskins MD;  Location: [facility];  Service: ENT;  Laterality: Right;   • US GUIDED FINE NEEDLE ASPIRATION  2/6/2019   • US GUIDED FINE NEEDLE ASPIRATION  4/12/2021   • VASECTOMY     ,   Family History   Problem Relation Age of Onset   • Cancer Sister    ,   Social History     Socioeconomic History   • Marital status:    Tobacco Use   • Smoking status: Never Smoker   • Smokeless tobacco: Never Used   Vaping Use   • Vaping Use: Never used   Substance and Sexual Activity   • Alcohol use: Yes     Comment: occ   • Drug use: Never   • Sexual activity: Defer     E-cigarette/Vaping   • E-cigarette/Vaping Use Never User         and Allergies:  Amoxicillin, Heparin, and Tetracyclines & related    Medication Review:     Current Outpatient Medications:   •  aspirin (aspirin) 81 MG EC tablet, Take 81 mg by mouth Daily. To stop 3 days prior to surgery, Disp: , Rfl:   •  brompheniramine-pseudoephedrine-DM 30-2-10 MG/5ML syrup, Take 10 mL by mouth 3 (Three) Times a Day As Needed for Congestion or Cough., Disp: 120 mL, Rfl: 0  •  methylPREDNISolone (MEDROL) 4 MG dose pack, , Disp: , Rfl:   •  triamcinolone (KENALOG) 0.1 % ointment, , Disp: , Rfl:       Objective   Vital Signs:  Vitals:    09/16/22 0700   BP: 117/70   Pulse: 64   SpO2: 96%   Weight: 83.2 kg (183 lb 8 oz)   Height: 170.2 cm (67.01\")     Body mass index is 28.73 kg/m².          Physical Exam:   Constitutional: Not in acute distress.  Eyes: Injected conjunctiva, EOMI. pupils equal reactive to light.  ENMT: Moreno score 2. Mallampati score 3. No oral thrush. Tonsils grade 1. Narrow distance in between the posterior pharyngeal pillars.  Neck:  No thyromegaly.  Trachea midline.  Heart: Regular rhythm and rate, no murmur  Lungs: Good and equal air entry bilaterally. No crackles or wheezing.  Nonlabored " breathing.       Abdomen:  Soft.  No tenderness.  Positive bowel sounds.  Extremities: No cyanosis, clubbing or pitting edema.  Warm extremities and well-perfused.  Neuro: Conscious, alert, oriented x3.  Gait is normal.  Strength 5/5 in arms and legs.  Psych: Appropriate mood and affect.    Integumentary: No rash.  Normal skin turgor.  Lymphatic: No palpable cervical or supraclavicular lymph nodes.      Diagnostic data:    Hemoglobin   Date Value Ref Range Status   11/27/2021 15.7 13.0 - 17.7 g/dL Final     CO2   Date Value Ref Range Status   11/27/2021 27.1 22.0 - 29.0 mmol/L Final     Download over the last 33 days: Usage = 30%; usage >4 H = 50%; PE 95% = 8.2; average use = 1-hour and 12 minutes.  Leak = 4.7 L/min; AHI = 1.9    Assessment   1. Severe LUCAS  2. Insomnia  3. Overweight, BMI 28        PLAN:      Refit with a new mask.  Increase exhalation relieve pressure to the maximum settings.  We will also prescribe him Lunesta 2 mg nightly for 1-2 months to help with CPAP tolerance.    Counseled for weight loss                This note was dictated utilizing Dragon dictation

## 2022-10-06 ENCOUNTER — TRANSCRIBE ORDERS (OUTPATIENT)
Dept: ADMINISTRATIVE | Facility: HOSPITAL | Age: 57
End: 2022-10-06

## 2022-10-06 DIAGNOSIS — R10.11 RUQ PAIN: Primary | ICD-10-CM

## 2022-10-25 ENCOUNTER — HOSPITAL ENCOUNTER (OUTPATIENT)
Dept: ULTRASOUND IMAGING | Facility: HOSPITAL | Age: 57
Discharge: HOME | End: 2022-10-25
Admitting: OTOLARYNGOLOGY

## 2022-10-25 DIAGNOSIS — E04.1 THYROID NODULE: ICD-10-CM

## 2022-10-25 PROCEDURE — 76536 US EXAM OF HEAD AND NECK: CPT

## 2022-11-03 ENCOUNTER — HOSPITAL ENCOUNTER (OUTPATIENT)
Dept: CT IMAGING | Facility: HOSPITAL | Age: 57
Discharge: HOME | End: 2022-11-03
Admitting: NURSE PRACTITIONER

## 2022-11-03 DIAGNOSIS — R10.11 RUQ PAIN: ICD-10-CM

## 2022-11-03 PROCEDURE — 0 IOPAMIDOL PER 1 ML: Performed by: NURSE PRACTITIONER

## 2022-11-03 PROCEDURE — 74177 CT ABD & PELVIS W/CONTRAST: CPT

## 2022-11-03 RX ADMIN — IOPAMIDOL 100 ML: 755 INJECTION, SOLUTION INTRAVENOUS at 09:40

## 2022-11-14 ENCOUNTER — TELEPHONE (OUTPATIENT)
Dept: SLEEP MEDICINE | Facility: HOSPITAL | Age: 57
End: 2022-11-14

## 2022-12-09 ENCOUNTER — OFFICE VISIT (OUTPATIENT)
Dept: SLEEP MEDICINE | Facility: HOSPITAL | Age: 57
End: 2022-12-09

## 2022-12-09 VITALS
BODY MASS INDEX: 29.85 KG/M2 | SYSTOLIC BLOOD PRESSURE: 125 MMHG | OXYGEN SATURATION: 97 % | WEIGHT: 190.2 LBS | DIASTOLIC BLOOD PRESSURE: 75 MMHG | HEART RATE: 57 BPM | HEIGHT: 67 IN

## 2022-12-09 DIAGNOSIS — G47.33 OSA ON CPAP: ICD-10-CM

## 2022-12-09 DIAGNOSIS — Z99.89 OSA ON CPAP: ICD-10-CM

## 2022-12-09 PROCEDURE — G0463 HOSPITAL OUTPT CLINIC VISIT: HCPCS

## 2022-12-09 RX ORDER — MELOXICAM 15 MG/1
15 TABLET ORAL DAILY
COMMUNITY
Start: 2022-10-06 | End: 2022-12-22

## 2022-12-09 NOTE — PROGRESS NOTES
[facility]                          Chief Complaint:   Follow up for obstructive sleep apnea    History of present illness:   Subjective     57-year-old male patient with history of DVT.  He complains of middle insomnia, snoring, apnea and gasping for breath.    LUCAS:  HST 6/15/22: JOS= 29/h; Supine JOS= 33.8/h.     He started using CPAP 8/18/22 but he had issues with the mask.  He tried nasal pillow but experienced air leak and could not tolerate a chinstrap.  He switched to FFM and continued to have issues with air leak.  He also reported difficulties exhaling against the machine.    Last seen on 9/16/2022.  Compliance was poor about 20%.  There was no significant residual apnea or air leak.  We prescribed Lunesta to improve compliance, which he used but it caused significant grogginess and sleepiness during the day and he could not tolerate it.    He tried several different masks and had issues.  First he said that he had to tighten the mask too much because of air leak and that cause discomfort.  He also reported rash from the mask and claustrophobia.  He does not think he can tolerate the CPAP and he has actually returned back to the DME..      DME: [facility]        ESS: Total score: 0         REVIEW OF SYSTEMS:   HEENT: No nasal congestion or postnasal drip but dry mouth  CARDIOVASCULAR: No chest pain, chest pressure or chest discomfort. No palpitations or edema.   RESPIRATORY: Shortness of breath, wheezing and cough.  GASTROINTESTINAL: No abdominal bloating or reflux   NEUROLOGICAL/PSYCHiATRY: Anxiety.  No depression  Endocrine: No excessive thirst, cold or warm intolerance  Genitourinary: No dysuria or frequent urination    Past Medical History:  Past Medical History:   Diagnosis Date   • Anesthesia complication     takes long time to wake up... trouble voiding after surgery    • Colovesical fistula 03/18/2020   • Diverticulosis    • DVT, lower extremity ([facility])    • Eczema     • Factor V Leiden ([facility])    • PE (pulmonary thromboembolism) ([facility])    • Pulmonary emboli ([facility])     2018   • Thyroid nodule    ,   Past Surgical History:   Procedure Laterality Date   • APPENDECTOMY     • COLON SURGERY     • COLON SURGERY  03/29/2020    sigmoid colectomy with fistula repair    • COLONOSCOPY  2010 2018   • COLOVESICAL FISTULA REPAIR LAPAROSCOPIC     • THYROIDECTOMY Right 9/7/2021    Procedure: RIGHT THYROIDECTOMY WITH ISTHMUSECTOMY;  Surgeon: Guilherme Haskins MD;  Location: [facility] OR;  Service: ENT;  Laterality: Right;   • US GUIDED FINE NEEDLE ASPIRATION  2/6/2019   • US GUIDED FINE NEEDLE ASPIRATION  4/12/2021   • VASECTOMY     ,   Family History   Problem Relation Age of Onset   • Cancer Sister    ,   Social History     Socioeconomic History   • Marital status:    Tobacco Use   • Smoking status: Never   • Smokeless tobacco: Never   Vaping Use   • Vaping Use: Never used   Substance and Sexual Activity   • Alcohol use: Yes     Comment: occ   • Drug use: Never   • Sexual activity: Defer     E-cigarette/Vaping   • E-cigarette/Vaping Use Never User         and Allergies:  Amoxicillin, Heparin, and Tetracyclines & related    Medication Review:     Current Outpatient Medications:   •  aspirin 81 MG EC tablet, Take 81 mg by mouth Daily. To stop 3 days prior to surgery, Disp: , Rfl:   •  diclofenac (VOLTAREN) 50 MG EC tablet, Take 50 mg by mouth 2 (Two) Times a Day., Disp: , Rfl:   •  meloxicam (MOBIC) 15 MG tablet, Take 15 mg by mouth Daily., Disp: , Rfl:   •  triamcinolone (KENALOG) 0.1 % ointment, , Disp: , Rfl:   •  brompheniramine-pseudoephedrine-DM 30-2-10 MG/5ML syrup, Take 10 mL by mouth 3 (Three) Times a Day As Needed for Congestion or Cough., Disp: 120 mL, Rfl: 0  •  eszopiclone (Lunesta) 2 MG tablet, Take 1 tablet by mouth Every Night. Take immediately before bedtime, Disp: 30 tablet, Rfl: 1  •  methylPREDNISolone (MEDROL) 4 MG dose pack, , Disp: , Rfl:       Objective  "  Vital Signs:  Vitals:    12/09/22 0900   BP: 125/75   Pulse: 57   SpO2: 97%   Weight: 86.3 kg (190 lb 3.2 oz)   Height: 170.2 cm (67.01\")     Body mass index is 29.78 kg/m².          Physical Exam:   Constitutional: Not in acute distress.  Eyes: Injected conjunctiva, EOMI. pupils equal reactive to light.  ENMT: Moreno score 2. Mallampati score 3. No oral thrush. Tonsils grade 1. Narrow distance in between the posterior pharyngeal pillars.  Narrow retropharyngeal space.  Small oral cavity.  Neck:  No thyromegaly.  Trachea midline.  Heart: Regular rhythm and rate, no murmur  Lungs: Good and equal air entry bilaterally. No crackles or wheezing.  Nonlabored breathing.       Abdomen:  Soft.  No tenderness.  Positive bowel sounds.  Extremities: No cyanosis, clubbing or pitting edema.  Warm extremities and well-perfused.  Neuro: Conscious, alert, oriented x3.  Gait is normal.  Strength 5/5 in arms and legs.  Psych: Appropriate mood and affect.    Integumentary: No rash.  Normal skin turgor.  Lymphatic: No palpable cervical or supraclavicular lymph nodes.      Diagnostic data:    Hemoglobin   Date Value Ref Range Status   11/27/2021 15.7 13.0 - 17.7 g/dL Final     CO2   Date Value Ref Range Status   11/27/2021 27.1 22.0 - 29.0 mmol/L Final     Download over the last 90 days: Usage = 20%; usage >4 H = 12%; PE 95% = 8.2; average use = 5-hour and 25 minutes.  Leak = 6.7 L/min; AHI = 2.4    Assessment   1. Severe LUCAS, intolerant to PAP therapy  2. Insomnia  3. Overweight, BMI 29        PLAN:      We discussed the option of treating sleep apnea.  He is intolerant to CPAP and does not wish and does not think that he could tolerate it again.  He is not a good candidate for oral appliance.  We discussed hypoglossal nerve stimulation and he seems to be interested with that.  His weight is compatible and he has severe sleep apnea.  We discussed that he would require drug-induced sleep endoscopy to make sure his airways are " compatible.  I will make a  referral to [facility] for evaluation.  I sent a message to Yue Crespo in our practice to make arrangement.    Counseled for weight loss          Time: 35 min      This note was dictated utilizing Dragon dictation

## 2022-12-14 ENCOUNTER — OFFICE VISIT (OUTPATIENT)
Dept: SURGERY | Facility: CLINIC | Age: 57
End: 2022-12-14

## 2022-12-14 ENCOUNTER — PREP FOR SURGERY (OUTPATIENT)
Dept: OTHER | Facility: HOSPITAL | Age: 57
End: 2022-12-14

## 2022-12-14 VITALS — BODY MASS INDEX: 29.35 KG/M2 | RESPIRATION RATE: 16 BRPM | HEIGHT: 67 IN | WEIGHT: 187 LBS

## 2022-12-14 DIAGNOSIS — K80.20 GALLSTONES: Primary | ICD-10-CM

## 2022-12-14 PROCEDURE — 99214 OFFICE O/P EST MOD 30 MIN: CPT | Performed by: SURGERY

## 2022-12-14 RX ORDER — SODIUM CHLORIDE, SODIUM LACTATE, POTASSIUM CHLORIDE, CALCIUM CHLORIDE 600; 310; 30; 20 MG/100ML; MG/100ML; MG/100ML; MG/100ML
100 INJECTION, SOLUTION INTRAVENOUS CONTINUOUS
Status: CANCELLED | OUTPATIENT
Start: 2022-12-14

## 2022-12-14 RX ORDER — SODIUM CHLORIDE 9 MG/ML
40 INJECTION, SOLUTION INTRAVENOUS AS NEEDED
Status: CANCELLED | OUTPATIENT
Start: 2022-12-14

## 2022-12-14 RX ORDER — CEFAZOLIN SODIUM 2 G/100ML
2 INJECTION, SOLUTION INTRAVENOUS ONCE
Status: CANCELLED | OUTPATIENT
Start: 2022-12-14 | End: 2022-12-14

## 2022-12-14 RX ORDER — SODIUM CHLORIDE 0.9 % (FLUSH) 0.9 %
10 SYRINGE (ML) INJECTION EVERY 12 HOURS SCHEDULED
Status: CANCELLED | OUTPATIENT
Start: 2022-12-14

## 2022-12-14 RX ORDER — INDOCYANINE GREEN AND WATER 25 MG
2.5 KIT INJECTION
Status: CANCELLED | OUTPATIENT
Start: 2022-12-15 | End: 2022-12-16

## 2022-12-14 RX ORDER — SODIUM CHLORIDE 0.9 % (FLUSH) 0.9 %
10 SYRINGE (ML) INJECTION AS NEEDED
Status: CANCELLED | OUTPATIENT
Start: 2022-12-14

## 2022-12-14 NOTE — PROGRESS NOTES
"Chief Complaint  Hernia ([place]) and Cholelithiasis    Subjective        Jaime Ruano presents to [facility] GENERAL SURGERY  History of Present Illness    Right upper quadrant pain  The patient reports that approximately 3.5 months ago, he started having some discomfort here right side. He reports exercising during this time and may have injured his right upper quadrant. His symptoms have not improved and is gradually getting worse. He reports having discomfort when turning a certain way or when twisting slightly here right side of the abdomen or when driving in the car and leaning. He reports seeing the PCP and completed a CT scan, and the results revealed gallstones and a small hernia, 4.5 cm. He denies feeling \"anything\" and assumes that his symptoms are localized to this area right side of the abdomen, according to the description. He denies having symptoms of nausea or vomit. He reports having a dull ache when moving a certain way during the night. He denies having a sharp pain. He denies having shortness of breath and states that his symptoms have gradually worsened. He may have associated symptoms due to an abnormal gallbladder/stones. He reports within the last 8, 9, or 10 years seeing the PCP and was recommended to have a cholecystectomy. He denies remembering the symptoms/reason for this recommendation. He reports taking a function test or another test, and the results revealed normal results. He refused the cholecystectomy in the past but has accepted to do a cholecystectomy today. He reports possibly aggravating his symptoms 3 weeks ago after starting some Yoga.     Medical history  The patient has a history of colovesical fistula.     Objective   Vital Signs:  Resp 16   Ht 170.2 cm (67\")   Wt 84.8 kg (187 lb)   BMI 29.29 kg/m²   Estimated body mass index is 29.29 kg/m² as calculated from the following:    Height as of this encounter: 170.2 cm (67\").    Weight as of this " encounter: 84.8 kg (187 lb).          Physical Exam  Constitutional:       Comments: The patient has no acute distress.      Pulmonary:      Comments: He has nonlabored breathing.     Abdominal:      Comments: The patient's abdomen is soft.        Result Review :                Assessment and Plan   There are no diagnoses linked to this encounter.      1. The patient's CT scan reveals a possible recurrent hernia, although I think this hernia may be covered up by his mesh. We cannot really feel anything on exam, including the gallstones. The gallstones seem to be symptomatic.     We will plan for laparoscopic cholecystectomy in the near future. During the laparoscopic cholecystectomy, we will be able to see if he has a hernia from the inside, and we can make plans to address this if necessary. The risks, benefits, and alternatives of the procedure were discussed extensively. All questions were answered. The patient voiced understanding and agreed to proceed with the above plan.        Follow Up   No follow-ups on file.  Patient was given instructions and counseling regarding his condition or for health maintenance advice. Please see specific information pulled into the AVS if appropriate.     Transcribed from ambient dictation for Randal Fields MD by Amina KING.  12/14/22   11:38 EST    Patient or patient representative verbalized consent to the visit recording.  I have personally performed the services described in this document as transcribed by the above individual, and it is both accurate and complete.

## 2022-12-14 NOTE — H&P (VIEW-ONLY)
Chief Complaint  Hernia ([place]) and Cholelithiasis    Subjective        Jaime Ruano presents to [facility] GENERAL SURGERY  History of Present Illness    Right upper quadrant pain  The patient reports that approximately 3.5 months ago, he started having some discomfort here right side. He reports exercising during this time and may have injured his right upper quadrant. His symptoms have not improved and is gradually getting worse. He reports having discomfort when turning a certain way or when twisting slightly here right side of the abdomen or when driving in the car and leaning. He reports seeing the PCP and completed a CT scan, and the results revealed gallstones and a small hernia, 4.5 cm. He denies feeling \"anything\" and assumes that his symptoms are localized to this area right side of the abdomen, according to the description. He denies having symptoms of nausea or vomit. He reports having a dull ache when moving a certain way during the night. He denies having a sharp pain. He denies having shortness of breath and states that his symptoms have gradually worsened. He may have associated symptoms due to an abnormal gallbladder/stones. He reports within the last 8, 9, or 10 years seeing the PCP and was recommended to have a cholecystectomy. He denies remembering the symptoms/reason for this recommendation. He reports taking a function test or another test, and the results revealed normal results. He refused the cholecystectomy in the past but has accepted to do a cholecystectomy today. He reports possibly aggravating his symptoms 3 weeks ago after starting some Yoga.     Medical history  The patient has a history of colovesical fistula.     Objective   Vital Signs:  Resp 16   Ht 170.2 cm (67\")   Wt 84.8 kg (187 lb)   BMI 29.29 kg/m²   Estimated body mass index is 29.29 kg/m² as calculated from the following:    Height as of this encounter: 170.2 cm (67\").    Weight as of this  encounter: 84.8 kg (187 lb).          Physical Exam  Constitutional:       Comments: The patient has no acute distress.      Pulmonary:      Comments: He has nonlabored breathing.     Abdominal:      Comments: The patient's abdomen is soft.        Result Review :                Assessment and Plan   There are no diagnoses linked to this encounter.      1. The patient's CT scan reveals a possible recurrent hernia, although I think this hernia may be covered up by his mesh. We cannot really feel anything on exam, including the gallstones. The gallstones seem to be symptomatic.     We will plan for laparoscopic cholecystectomy in the near future. During the laparoscopic cholecystectomy, we will be able to see if he has a hernia from the inside, and we can make plans to address this if necessary. The risks, benefits, and alternatives of the procedure were discussed extensively. All questions were answered. The patient voiced understanding and agreed to proceed with the above plan.        Follow Up   No follow-ups on file.  Patient was given instructions and counseling regarding his condition or for health maintenance advice. Please see specific information pulled into the AVS if appropriate.     Transcribed from ambient dictation for Randal Fields MD by Amina KING.  12/14/22   11:38 EST    Patient or patient representative verbalized consent to the visit recording.  I have personally performed the services described in this document as transcribed by the above individual, and it is both accurate and complete.

## 2022-12-22 NOTE — PRE-PROCEDURE INSTRUCTIONS
Patient instructed to have no food past midnight, clears up to 2 hours prior to arrival time. Patient instructed to wear no lotions, jewelry or piercing's day of surgery. Patient to shower with surgical soap am of surgery.

## 2022-12-28 ENCOUNTER — HOSPITAL ENCOUNTER (OUTPATIENT)
Facility: HOSPITAL | Age: 57
Setting detail: HOSPITAL OUTPATIENT SURGERY
Discharge: HOME | End: 2022-12-28
Attending: SURGERY | Admitting: SURGERY
Payer: COMMERCIAL

## 2022-12-28 ENCOUNTER — ANESTHESIA EVENT (OUTPATIENT)
Dept: PERIOP | Facility: HOSPITAL | Age: 57
End: 2022-12-28
Payer: COMMERCIAL

## 2022-12-28 ENCOUNTER — ANESTHESIA (OUTPATIENT)
Dept: PERIOP | Facility: HOSPITAL | Age: 57
End: 2022-12-28
Payer: COMMERCIAL

## 2022-12-28 VITALS
TEMPERATURE: 97 F | OXYGEN SATURATION: 98 % | RESPIRATION RATE: 20 BRPM | SYSTOLIC BLOOD PRESSURE: 119 MMHG | HEART RATE: 64 BPM | BODY MASS INDEX: 29.1 KG/M2 | HEIGHT: 67 IN | WEIGHT: 185.41 LBS | DIASTOLIC BLOOD PRESSURE: 79 MMHG

## 2022-12-28 DIAGNOSIS — K80.20 GALLSTONES: ICD-10-CM

## 2022-12-28 PROCEDURE — 25010000002 FENTANYL CITRATE (PF) 50 MCG/ML SOLUTION

## 2022-12-28 PROCEDURE — 47562 LAPAROSCOPIC CHOLECYSTECTOMY: CPT | Performed by: SURGERY

## 2022-12-28 PROCEDURE — 93010 ELECTROCARDIOGRAM REPORT: CPT | Performed by: INTERNAL MEDICINE

## 2022-12-28 PROCEDURE — 25010000002 PROPOFOL 10 MG/ML EMULSION

## 2022-12-28 PROCEDURE — 47562 LAPAROSCOPIC CHOLECYSTECTOMY: CPT | Performed by: SPECIALIST/TECHNOLOGIST, OTHER

## 2022-12-28 PROCEDURE — 88304 TISSUE EXAM BY PATHOLOGIST: CPT | Performed by: SURGERY

## 2022-12-28 PROCEDURE — 25010000002 ONDANSETRON PER 1 MG

## 2022-12-28 PROCEDURE — 93005 ELECTROCARDIOGRAM TRACING: CPT | Performed by: ANESTHESIOLOGY

## 2022-12-28 PROCEDURE — 25010000002 DEXAMETHASONE PER 1 MG

## 2022-12-28 PROCEDURE — 25010000002 MIDAZOLAM PER 1 MG: Performed by: ANESTHESIOLOGY

## 2022-12-28 DEVICE — LIGACLIP 10-M/L, 10MM ENDOSCOPIC ROTATING MULTIPLE CLIP APPLIERS
Type: IMPLANTABLE DEVICE | Site: ABDOMEN | Status: FUNCTIONAL
Brand: LIGACLIP

## 2022-12-28 RX ORDER — MEPERIDINE HYDROCHLORIDE 25 MG/ML
12.5 INJECTION INTRAMUSCULAR; INTRAVENOUS; SUBCUTANEOUS
Status: DISCONTINUED | OUTPATIENT
Start: 2022-12-28 | End: 2022-12-28 | Stop reason: HOSPADM

## 2022-12-28 RX ORDER — LIDOCAINE HYDROCHLORIDE 20 MG/ML
INJECTION, SOLUTION EPIDURAL; INFILTRATION; INTRACAUDAL; PERINEURAL AS NEEDED
Status: DISCONTINUED | OUTPATIENT
Start: 2022-12-28 | End: 2022-12-28 | Stop reason: SURG

## 2022-12-28 RX ORDER — INDOCYANINE GREEN AND WATER 25 MG
2.5 KIT INJECTION
Status: COMPLETED | OUTPATIENT
Start: 2022-12-28 | End: 2022-12-28

## 2022-12-28 RX ORDER — PROMETHAZINE HYDROCHLORIDE 25 MG/1
25 SUPPOSITORY RECTAL ONCE AS NEEDED
Status: DISCONTINUED | OUTPATIENT
Start: 2022-12-28 | End: 2022-12-28 | Stop reason: HOSPADM

## 2022-12-28 RX ORDER — DEXMEDETOMIDINE HYDROCHLORIDE 100 UG/ML
INJECTION, SOLUTION INTRAVENOUS AS NEEDED
Status: DISCONTINUED | OUTPATIENT
Start: 2022-12-28 | End: 2022-12-28 | Stop reason: SURG

## 2022-12-28 RX ORDER — SODIUM CHLORIDE 0.9 % (FLUSH) 0.9 %
10 SYRINGE (ML) INJECTION EVERY 12 HOURS SCHEDULED
Status: DISCONTINUED | OUTPATIENT
Start: 2022-12-28 | End: 2022-12-28 | Stop reason: HOSPADM

## 2022-12-28 RX ORDER — INDOCYANINE GREEN AND WATER 25 MG
2.5 KIT INJECTION
Status: DISCONTINUED | OUTPATIENT
Start: 2022-12-29 | End: 2022-12-28

## 2022-12-28 RX ORDER — GLYCOPYRROLATE 0.2 MG/ML
0.2 INJECTION INTRAMUSCULAR; INTRAVENOUS
Status: COMPLETED | OUTPATIENT
Start: 2022-12-28 | End: 2022-12-28

## 2022-12-28 RX ORDER — FENTANYL CITRATE 50 UG/ML
INJECTION, SOLUTION INTRAMUSCULAR; INTRAVENOUS AS NEEDED
Status: DISCONTINUED | OUTPATIENT
Start: 2022-12-28 | End: 2022-12-28 | Stop reason: SURG

## 2022-12-28 RX ORDER — HYDROCODONE BITARTRATE AND ACETAMINOPHEN 5; 325 MG/1; MG/1
1 TABLET ORAL ONCE AS NEEDED
Status: DISCONTINUED | OUTPATIENT
Start: 2022-12-28 | End: 2022-12-28 | Stop reason: HOSPADM

## 2022-12-28 RX ORDER — CLINDAMYCIN PHOSPHATE 150 MG/ML
INJECTION, SOLUTION INTRAVENOUS AS NEEDED
Status: DISCONTINUED | OUTPATIENT
Start: 2022-12-28 | End: 2022-12-28 | Stop reason: SURG

## 2022-12-28 RX ORDER — SODIUM CHLORIDE 9 MG/ML
40 INJECTION, SOLUTION INTRAVENOUS AS NEEDED
Status: DISCONTINUED | OUTPATIENT
Start: 2022-12-28 | End: 2022-12-28 | Stop reason: HOSPADM

## 2022-12-28 RX ORDER — ONDANSETRON 4 MG/1
4 TABLET, FILM COATED ORAL EVERY 6 HOURS PRN
Qty: 10 TABLET | Refills: 1 | Status: SHIPPED | OUTPATIENT
Start: 2022-12-28 | End: 2023-03-07

## 2022-12-28 RX ORDER — OXYCODONE HYDROCHLORIDE 5 MG/1
5 TABLET ORAL
Status: DISCONTINUED | OUTPATIENT
Start: 2022-12-28 | End: 2022-12-28 | Stop reason: HOSPADM

## 2022-12-28 RX ORDER — ONDANSETRON 2 MG/ML
4 INJECTION INTRAMUSCULAR; INTRAVENOUS ONCE AS NEEDED
Status: DISCONTINUED | OUTPATIENT
Start: 2022-12-28 | End: 2022-12-28 | Stop reason: HOSPADM

## 2022-12-28 RX ORDER — DEXAMETHASONE SODIUM PHOSPHATE 4 MG/ML
INJECTION, SOLUTION INTRA-ARTICULAR; INTRALESIONAL; INTRAMUSCULAR; INTRAVENOUS; SOFT TISSUE AS NEEDED
Status: DISCONTINUED | OUTPATIENT
Start: 2022-12-28 | End: 2022-12-28 | Stop reason: SURG

## 2022-12-28 RX ORDER — ROCURONIUM BROMIDE 10 MG/ML
INJECTION, SOLUTION INTRAVENOUS AS NEEDED
Status: DISCONTINUED | OUTPATIENT
Start: 2022-12-28 | End: 2022-12-28 | Stop reason: SURG

## 2022-12-28 RX ORDER — DOCUSATE SODIUM 100 MG/1
100 CAPSULE, LIQUID FILLED ORAL 2 TIMES DAILY PRN
Qty: 10 CAPSULE | Refills: 1 | Status: SHIPPED | OUTPATIENT
Start: 2022-12-28 | End: 2023-03-07

## 2022-12-28 RX ORDER — MIDAZOLAM HYDROCHLORIDE 1 MG/ML
1 INJECTION INTRAMUSCULAR; INTRAVENOUS ONCE
Status: COMPLETED | OUTPATIENT
Start: 2022-12-28 | End: 2022-12-28

## 2022-12-28 RX ORDER — PROMETHAZINE HYDROCHLORIDE 12.5 MG/1
25 TABLET ORAL ONCE AS NEEDED
Status: DISCONTINUED | OUTPATIENT
Start: 2022-12-28 | End: 2022-12-28 | Stop reason: HOSPADM

## 2022-12-28 RX ORDER — PROPOFOL 10 MG/ML
VIAL (ML) INTRAVENOUS AS NEEDED
Status: DISCONTINUED | OUTPATIENT
Start: 2022-12-28 | End: 2022-12-28 | Stop reason: SURG

## 2022-12-28 RX ORDER — CELECOXIB 100 MG/1
200 CAPSULE ORAL DAILY
Status: COMPLETED | OUTPATIENT
Start: 2022-12-28 | End: 2022-12-28

## 2022-12-28 RX ORDER — HYDROCODONE BITARTRATE AND ACETAMINOPHEN 5; 325 MG/1; MG/1
1-2 TABLET ORAL EVERY 4 HOURS PRN
Qty: 20 TABLET | Refills: 0 | Status: SHIPPED | OUTPATIENT
Start: 2022-12-28 | End: 2023-03-07

## 2022-12-28 RX ORDER — SODIUM CHLORIDE, SODIUM LACTATE, POTASSIUM CHLORIDE, CALCIUM CHLORIDE 600; 310; 30; 20 MG/100ML; MG/100ML; MG/100ML; MG/100ML
100 INJECTION, SOLUTION INTRAVENOUS CONTINUOUS
Status: DISCONTINUED | OUTPATIENT
Start: 2022-12-28 | End: 2022-12-28 | Stop reason: HOSPADM

## 2022-12-28 RX ORDER — CEFAZOLIN SODIUM 2 G/100ML
2 INJECTION, SOLUTION INTRAVENOUS ONCE
Status: DISCONTINUED | OUTPATIENT
Start: 2022-12-28 | End: 2022-12-28 | Stop reason: HOSPADM

## 2022-12-28 RX ORDER — SODIUM CHLORIDE 0.9 % (FLUSH) 0.9 %
10 SYRINGE (ML) INJECTION AS NEEDED
Status: DISCONTINUED | OUTPATIENT
Start: 2022-12-28 | End: 2022-12-28 | Stop reason: HOSPADM

## 2022-12-28 RX ORDER — ONDANSETRON 2 MG/ML
INJECTION INTRAMUSCULAR; INTRAVENOUS AS NEEDED
Status: DISCONTINUED | OUTPATIENT
Start: 2022-12-28 | End: 2022-12-28 | Stop reason: SURG

## 2022-12-28 RX ORDER — MAGNESIUM HYDROXIDE 1200 MG/15ML
LIQUID ORAL AS NEEDED
Status: DISCONTINUED | OUTPATIENT
Start: 2022-12-28 | End: 2022-12-28 | Stop reason: HOSPADM

## 2022-12-28 RX ORDER — SODIUM CHLORIDE, SODIUM LACTATE, POTASSIUM CHLORIDE, CALCIUM CHLORIDE 600; 310; 30; 20 MG/100ML; MG/100ML; MG/100ML; MG/100ML
9 INJECTION, SOLUTION INTRAVENOUS CONTINUOUS PRN
Status: DISCONTINUED | OUTPATIENT
Start: 2022-12-28 | End: 2022-12-28 | Stop reason: HOSPADM

## 2022-12-28 RX ORDER — PHENYLEPHRINE HCL IN 0.9% NACL 1 MG/10 ML
SYRINGE (ML) INTRAVENOUS AS NEEDED
Status: DISCONTINUED | OUTPATIENT
Start: 2022-12-28 | End: 2022-12-28 | Stop reason: SURG

## 2022-12-28 RX ORDER — ACETAMINOPHEN 500 MG
1000 TABLET ORAL ONCE
Status: COMPLETED | OUTPATIENT
Start: 2022-12-28 | End: 2022-12-28

## 2022-12-28 RX ADMIN — Medication 100 MCG: at 14:50

## 2022-12-28 RX ADMIN — GLYCOPYRROLATE 0.2 MG: 0.2 INJECTION INTRAMUSCULAR; INTRAVENOUS at 13:52

## 2022-12-28 RX ADMIN — INDOCYANINE GREEN AND WATER 25 MG: KIT at 13:49

## 2022-12-28 RX ADMIN — PROPOFOL 180 MG: 10 INJECTION, EMULSION INTRAVENOUS at 14:32

## 2022-12-28 RX ADMIN — DEXAMETHASONE SODIUM PHOSPHATE 4 MG: 4 INJECTION, SOLUTION INTRA-ARTICULAR; INTRALESIONAL; INTRAMUSCULAR; INTRAVENOUS; SOFT TISSUE at 14:38

## 2022-12-28 RX ADMIN — SODIUM CHLORIDE, POTASSIUM CHLORIDE, SODIUM LACTATE AND CALCIUM CHLORIDE 9 ML/HR: 600; 310; 30; 20 INJECTION, SOLUTION INTRAVENOUS at 12:08

## 2022-12-28 RX ADMIN — ROCURONIUM BROMIDE 50 MG: 10 INJECTION, SOLUTION INTRAVENOUS at 14:32

## 2022-12-28 RX ADMIN — CLINDAMYCIN PHOSPHATE 900 MG: 150 INJECTION, SOLUTION INTRAVENOUS at 14:36

## 2022-12-28 RX ADMIN — MIDAZOLAM HYDROCHLORIDE 1 MG: 2 INJECTION, SOLUTION INTRAMUSCULAR; INTRAVENOUS at 13:52

## 2022-12-28 RX ADMIN — FENTANYL CITRATE 50 MCG: 50 INJECTION, SOLUTION INTRAMUSCULAR; INTRAVENOUS at 15:05

## 2022-12-28 RX ADMIN — ROCURONIUM BROMIDE 20 MG: 10 INJECTION, SOLUTION INTRAVENOUS at 14:55

## 2022-12-28 RX ADMIN — DEXMEDETOMIDINE HYDROCHLORIDE 20 MCG: 100 INJECTION, SOLUTION, CONCENTRATE INTRAVENOUS at 14:56

## 2022-12-28 RX ADMIN — CELECOXIB 200 MG: 100 CAPSULE ORAL at 13:20

## 2022-12-28 RX ADMIN — DEXMEDETOMIDINE HYDROCHLORIDE 10 MCG: 100 INJECTION, SOLUTION, CONCENTRATE INTRAVENOUS at 15:14

## 2022-12-28 RX ADMIN — ACETAMINOPHEN 1000 MG: 500 TABLET ORAL at 13:20

## 2022-12-28 RX ADMIN — SUGAMMADEX 200 MG: 100 INJECTION, SOLUTION INTRAVENOUS at 15:40

## 2022-12-28 RX ADMIN — OXYCODONE HYDROCHLORIDE 5 MG: 5 TABLET ORAL at 16:35

## 2022-12-28 RX ADMIN — FENTANYL CITRATE 100 MCG: 50 INJECTION, SOLUTION INTRAMUSCULAR; INTRAVENOUS at 14:32

## 2022-12-28 RX ADMIN — ONDANSETRON 4 MG: 2 INJECTION INTRAMUSCULAR; INTRAVENOUS at 14:38

## 2022-12-28 RX ADMIN — LIDOCAINE HYDROCHLORIDE 50 MG: 20 INJECTION, SOLUTION EPIDURAL; INFILTRATION; INTRACAUDAL; PERINEURAL at 14:32

## 2022-12-28 NOTE — ANESTHESIA PREPROCEDURE EVALUATION
Anesthesia Evaluation     Patient summary reviewed and Nursing notes reviewed   no history of anesthetic complications:  NPO Solid Status: > 8 hours  NPO Liquid Status: > 2 hours           Airway   Mallampati: III  TM distance: >3 FB  Neck ROM: full  No difficulty expected  Dental      Pulmonary - normal exam    breath sounds clear to auscultation  (+) pulmonary embolism, sleep apnea,   Cardiovascular - normal exam  Exercise tolerance: good (4-7 METS)    Rhythm: regular  Rate: normal    (+) DVT,       Neuro/Psych- negative ROS  GI/Hepatic/Renal/Endo      Musculoskeletal (-) negative ROS    Abdominal    Substance History - negative use     OB/GYN negative ob/gyn ROS         Other - negative ROS       ROS/Med Hx Other: PAT Nursing Notes unavailable.     Factor v leiden deficiency    Pt had some thyroid cysts removed, pt is euthyroid       ABNORMAL ECG -11/21  Sinus rhythm  Inferior infarct, old  When compared with ECG of 07-Sep-2021 7:51:08,  New or worsened ischemia or infarction         Anesthesia Plan    ASA 3     general     (Patient understands anesthesia not responsible for dental damage.    Pt slow to wake up and also has urinary retention, please limit narcotics if possible)  intravenous induction     Anesthetic plan, risks, benefits, and alternatives have been provided, discussed and informed consent has been obtained with: patient.    Use of blood products discussed with patient .   Plan discussed with CRNA.        CODE STATUS:

## 2022-12-28 NOTE — OP NOTE
Preoperative diagnosis: Symptomatic cholelithiasis    Postoperative diagnosis: Same    Procedure: Laparoscopic cholecystectomy    Surgeon: Diamond    Anesthesia: General    Assistant: Radha Don    EBL: 7    Specimens: Gallbladder with contents    Complications: None    Indications: 57-year-old male with right upper quadrant pain and gallstones on imaging.  He presents today for laparoscopic cholecystectomy    PROCEDURE    Patient was seen in the preoperative holding area.  Risks, benefits, and alternatives of the operation were again discussed in detail.  All of the patient's and family's questions were answered.  They voiced understanding, and agreed to proceed.    Patient was brought to the operating room.  Monitoring devices and Sai stockings were placed.  Anesthesia was administered.  Patient was prepped and draped in standard surgical fashion.  After prepping and draping a timeout was performed to verify both the correct patient and correct procedure.    We began by injecting local anesthesia in the left upper quadrant.  An incision to accommodate a 5 mm trocar was made in the skin, and the Veress needle was inserted into the abdomen.  Intra-abdominal placement was verified with a normal saline drop test.  After verification, the abdomen was insufflated to 15 mmHg pressure.  Once the abdomen was insufflated, we used the Visiport technique, the abdomen was entered in the left upper quadrant.  Once the abdomen was entered we reinserted the laparoscope and looked underneath our Visiport and Veress needle entry sites, and we saw no obvious underlying injury.  We inspected the abdomen and saw that he did have some omental adhesions up to the anterior abdominal wall around the placement of the previous mesh.  Upon inspection, I did not see any obvious recurrence of the hernia.  We then placed our subsequent trocars.  We injected local anesthesia in the supraumbilical region and made an incision to accommodate  a 5 mm trocar.it was just to the right lateral side of the mesh and we made sure to place the trocar under direct visualization away from the previously placed mesh.  Again, after injection of local anesthesia and under direct visualization, a 12 mm trocar was placed in the subxiphoid region, and two 5 mm trocars were placed in the right upper quadrant.  The patient was placed in reverse Trendelenburg position.    We began by grasping the gallbladder and retracting it above the liver.  We took down any adhesions to the gallbladder with a combination of blunt dissection and electrocautery.  The gallbladder was then grasped and retracted out laterally.  We began by making a window between the liver bed and the gallbladder itself.  We skeletonized any structures within this window.  In this window we were able to see 2 structures going directly into the gallbladder, 1 which appear to be the cystic duct and 1 which appear to be the cystic artery.  Through this window we were also able to see the right lobe of the liver, and we saw no crossing structures.  We rotated the gallbladder medially and were able to see through this window medially and laterally with no crossing structures behind.  Thus we felt we obtained a critical view of safety.    Throughout the course of the operation we used the IC-Green onlay.  With the IC-Green we were able to verify our anatomy.  We are able to visualize the gallbladder, the cystic duct, and the right lobe of the liver with the IC-Green.  Additionally, we saw structure medially and inferiorly to our dissection which appeared to be the common bile duct.  The cystic artery did not light up with the IC-Green, as would be expected.  Thus we felt we verified our anatomy after obtaining critical view of safety and identified to the common bile duct.    We then placed 2 clips proximally and 1 clip distally on the cystic duct.  We placed 2 clips proximally and 1 clip distally on the cystic  artery.  We ligated these with laparoscopic scissors.  We then elevated the gallbladder out of the liver bed and carefully dissected it free with electrocautery.  The gallbladder was then amputated, placed in Endo Catch bag, and removed from the abdomen.  The specimen was passed off for pathology.    We inspected our operative field and made sure everything appeared to be hemostatic.  We inspected the common bile duct with the IC-Green, and it appeared to be a single column of bile with no obvious signs of obstruction or injury.  We irrigated the right upper quadrant and suctioned free any bile or blood.  We irrigated until the effluent was clear, and suctioned that free.    We then went back in with the camera from the upper ports and I began taking down adhesions to the mesh.  I freed up almost all of the adhesions to the anterior abdominal wall mesh.  I did not see any evidence of a recurrent hernia.  The mesh was well incorporated with no obvious signs and no obvious defects.    We then removed the 12 mm trocar, and that site was closed with a Charles-Reymundo device and 0 Vicryl suture.  The remaining trocars were then removed under direct visualization, and the abdomen was desufflated.  Skin incisions were closed with subcuticular 4-0 Vicryl stitch and dressed with skin glue.    The patient was then aroused from anesthesia, and taken off the OR table, and taken to PACU in stable condition.  Sponge, needle, and instrument counts were correct x2.  Radha Don was present for the entire procedure and helped in all portions of the case.    Assistant: Radha Don CSA was responsible for performing the following activities: Retraction, Suction, Irrigation, Suturing, Closing, Placing Dressing and Held/Positioned Camera and their skilled assistance was necessary for the success of this case.      The operative note was dictated with the help of the Dragon dictation system.

## 2022-12-28 NOTE — ANESTHESIA POSTPROCEDURE EVALUATION
Patient: Jaime Ruano    Procedure Summary     Date: 12/28/22 Room / Location: [facility] OR 02 / [facility] MAIN OR    Anesthesia Start: 1426 Anesthesia Stop: 1555    Procedure: CHOLECYSTECTOMY LAPAROSCOPIC (Abdomen) Diagnosis:       Gallstones      (Gallstones [K80.20])    Surgeons: Randal Fields MD Provider: Henry Freire MD    Anesthesia Type: general ASA Status: 3          Anesthesia Type: general    Vitals  Vitals Value Taken Time   /67 12/28/22 1617   Temp 36.2 °C (97.2 °F) 12/28/22 1550   Pulse 82 12/28/22 1624   Resp 14 12/28/22 1600   SpO2 96 % 12/28/22 1624   Vitals shown include unvalidated device data.        Post Anesthesia Care and Evaluation    Patient location during evaluation: bedside  Patient participation: complete - patient participated  Level of consciousness: awake  Pain management: adequate    Airway patency: patent  Anesthetic complications: No anesthetic complications  PONV Status: none  Cardiovascular status: acceptable and stable  Respiratory status: acceptable  Hydration status: acceptable    Comments: An Anesthesiologist personally participated in the most demanding procedures (including induction and emergence if applicable) in the anesthesia plan, monitored the course of anesthesia administration at frequent intervals and remained physically present and available for immediate diagnosis and treatment of emergencies.

## 2022-12-28 NOTE — ADDENDUM NOTE
Addendum  created 12/28/22 1627 by Henry Freire MD    Order list changed, Pharmacy for encounter modified

## 2022-12-28 NOTE — DISCHARGE INSTRUCTIONS
DISCHARGE INSTRUCTIONS LAPAROSCOPIC CHOLECYSTECTOMY/APPENDECTOMY  (GALL BLADDER)      For your surgery you had:  General anesthesia (you may have a sore throat for the first 24 hours)     You may experience dizziness, drowsiness, or light-headedness for several hours following surgery.  Do not stay alone tonight.  Limit your activity for 24 hours.  Resume your diet slowly.  Follow whatever special dietary instructions you may have been given by your doctor.  You should not drive, operate machinery, drink alcohol, or sign legally binding documents for 24 hours or while you are taking pain medication.      NOTIFY YOUR DOCTOR IF YOU EXPERIENCE ANY OF THE FOLLOWING:  Temperature greater than 101 degrees Fahrenheit  Shaking Chills  Redness or excessive drainage from incision  Nausea, vomiting and/or pain that is not controlled by prescribed medications  Increase in bleeding or bleeding that is excessive  Unable to urinate in 6 hours after surgery  If unable to reach your doctor, please go to the closest Emergency Room You may shower tomorrow.  Apply an ice pack 24-48 hours.  You may experience gas discomfort 24-48 hours after discharge, especially in chest and shoulders.  Changing position frequently may alleviate this discomfort.  If you have excessive pain, swelling, redness, drainage or other problems, notify your physician.  If unable to urinate in 6 to 8 hours after surgery or urinating frequently in small amounts, notify your doctor or go to the nearest Emergency Room.  Medications per physician instructions as indicated on Discharge Medication Information Sheet.    Last dose of pain medication was given at:        SPECIAL INSTRUCTIONS:

## 2022-12-30 LAB — QT INTERVAL: 390 MS

## 2023-01-12 ENCOUNTER — OFFICE VISIT (OUTPATIENT)
Dept: SURGERY | Facility: CLINIC | Age: 58
End: 2023-01-12
Payer: COMMERCIAL

## 2023-01-12 VITALS — BODY MASS INDEX: 28.88 KG/M2 | WEIGHT: 184 LBS | HEIGHT: 67 IN | RESPIRATION RATE: 16 BRPM

## 2023-01-12 DIAGNOSIS — K80.20 GALLSTONES: Primary | ICD-10-CM

## 2023-01-12 PROCEDURE — 99024 POSTOP FOLLOW-UP VISIT: CPT | Performed by: SURGERY

## 2023-01-12 NOTE — PROGRESS NOTES
Chief Complaint: Post-op    Subjective         History of Present Illness  Jaime Ruano is a 57 y.o. male presents to [facility] GENERAL SURGERY. The patient is to be seen for status post laparoscopic cholecystectomy.    Status post laparoscopic cholecystectomy  The patient reports that he is eating and drinking well. He states that he has been avoiding spicy foods. He reports that his bowel movements have been slightly softer than normal, but he denies diarrhea. The patient reports that he ate fries on 01/11/2023, and he did well with this. He denies any right upper quadrant pain. The patient reports that he has been experiencing loss of sensation at his incision site. He states that his incisions are healing well. The patient reports that he feels a pulling sensation when lifting. The patient reports that he has been doing yoga and stretching. Pathology results noted mild chronic cholecystitis,   cholelithiasis and one benign lymph node. 10 gallstones averaging 0.50 cm in size.     Objective     Past Medical History:   Diagnosis Date   • Anesthesia complication     takes long time to wake up... trouble voiding after surgery    • Colovesical fistula 03/18/2020   • Diverticulosis    • DVT, lower extremity (HCC)    • Eczema    • Factor V Leiden (HCC)    • Gallstones 12/14/2022   • Pulmonary emboli (HCC)     2018   • Sleep apnea    • Thyroid nodule        Past Surgical History:   Procedure Laterality Date   • APPENDECTOMY     • CHOLECYSTECTOMY N/A 12/28/2022    Procedure: CHOLECYSTECTOMY LAPAROSCOPIC;  Surgeon: Randal Fields MD;  Location: [facility];  Service: General;  Laterality: N/A;   • COLON SURGERY     • COLON SURGERY  03/29/2020    sigmoid colectomy with fistula repair    • COLONOSCOPY  2010 2018   • COLOVESICAL FISTULA REPAIR LAPAROSCOPIC     • HEMORRHOIDECTOMY  2017   • HERNIA REPAIR     • THYROIDECTOMY Right 09/07/2021    Procedure: RIGHT THYROIDECTOMY WITH ISTHMUSECTOMY;   Surgeon: Guilherme Haskins MD;  Location: [facility] MAIN OR;  Service: ENT;  Laterality: Right;   • US GUIDED FINE NEEDLE ASPIRATION  02/06/2019   • US GUIDED FINE NEEDLE ASPIRATION  04/12/2021   • VASECTOMY           Current Outpatient Medications:   •  aspirin 81 MG EC tablet, Take 81 mg by mouth Daily., Disp: , Rfl:   •  docusate sodium (Colace) 100 MG capsule, Take 1 capsule by mouth 2 (Two) Times a Day As Needed for Constipation., Disp: 10 capsule, Rfl: 1  •  HYDROcodone-acetaminophen (NORCO) 5-325 MG per tablet, Take 1-2 tablets by mouth Every 4 (Four) Hours As Needed (Pain)., Disp: 20 tablet, Rfl: 0  •  ondansetron (Zofran) 4 MG tablet, Take 1 tablet by mouth Every 6 (Six) Hours As Needed for Nausea or Vomiting., Disp: 10 tablet, Rfl: 1    Allergies   Allergen Reactions   • Amoxicillin GI Intolerance     Diarrhea    • Heparin Itching     Flush feeling   • Tetracyclines & Related Other (See Comments)     Reaction unknown        Family History   Problem Relation Age of Onset   • Cancer Sister    • Malig Hyperthermia Neg Hx        Social History     Socioeconomic History   • Marital status:    Tobacco Use   • Smoking status: Never   • Smokeless tobacco: Never   Vaping Use   • Vaping Use: Never used   Substance and Sexual Activity   • Alcohol use: Yes     Comment: 1 cocktail  daily, 3oz burbon w gingerale   • Drug use: Never   • Sexual activity: Yes     Partners: Female        Physical Exam   No acute distress. Nonlabored breathing. Abdomen is soft.    Post Surgical Incision  Surgical wound: Healing well    Result Review :               Assessment and Plan    There are no diagnoses linked to this encounter.     1. Status post laparoscopic cholecystectomy with the findings of chronic cholecystitis and cholelithiasis.   - Patient is doing well. He is expected and pleased with his overall progress. At this point in time, he can follow up with me as needed for the gallbladder. Regarding the potential  hernia that was noted on his CT scan, I do not see any evidence of a recurrent hernia. When we did his surgery, his mesh appeared to be in good place with no evidence of recurrence, so I think this may be just leftover fatty tissue from his previous hernia repair. I recommend that we really do not pursue any further surgery for that and the patient has no symptoms and does not feel any evidence of a recurrent hernia. He can follow up with me as previously directed for colonoscopy. Discussed with the patient. All questions were answered. He voiced understanding and agrees to proceed with the above plan.      Transcribed from ambient dictation for Randal Fields MD by Shwetha Rosario.  01/12/23   11:37 EST    Patient or patient representative verbalized consent to the visit recording.  I have personally performed the services described in this document as transcribed by the above individual, and it is both accurate and complete.      Follow Up   No follow-ups on file.  Patient was given instructions and counseling regarding his condition or for health maintenance advice. Please see specific information pulled into the AVS if appropriate.       Transcribed from ambient dictation for Randal Fields MD by Shwetha Rosario.  01/12/23   11:34 EST    Patient or patient representative verbalized consent to the visit recording.  I have personally performed the services described in this document as transcribed by the above individual, and it is both accurate and complete.

## 2023-03-07 NOTE — SIGNIFICANT NOTE
Education provided the Patient on the following:    - Nothing to Eat or Drink after MN the night before the procedure  -You will need to have someone drive you home after your Surgery and remain with you for 24 hours after the Procedure  - The date of your procedure, your are welcome to have one visitor at bedside or remain within 10-15 minutes of [facility]  -Please wear warm socks when you arrive for your Surgery  -Remove all jewelry and leave any valuables before arriving on the date of your procedure (all will have to be removed before leaving preop)  -You will need to arrive at (will call on Wednesday) 3/7/23 Surgery  -Feel free to contact us at: [phone] with any additional questions/concerns

## 2023-03-09 ENCOUNTER — ANESTHESIA (OUTPATIENT)
Dept: SURGERY | Facility: SURGERY CENTER | Age: 58
End: 2023-03-09
Payer: COMMERCIAL

## 2023-03-09 ENCOUNTER — ANESTHESIA EVENT (OUTPATIENT)
Dept: SURGERY | Facility: SURGERY CENTER | Age: 58
End: 2023-03-09
Payer: COMMERCIAL

## 2023-03-09 ENCOUNTER — HOSPITAL ENCOUNTER (OUTPATIENT)
Facility: SURGERY CENTER | Age: 58
Setting detail: HOSPITAL OUTPATIENT SURGERY
Discharge: HOME | End: 2023-03-09
Attending: OTOLARYNGOLOGY | Admitting: OTOLARYNGOLOGY
Payer: COMMERCIAL

## 2023-03-09 VITALS
HEIGHT: 67 IN | TEMPERATURE: 97.7 F | HEART RATE: 53 BPM | OXYGEN SATURATION: 98 % | WEIGHT: 175.8 LBS | RESPIRATION RATE: 16 BRPM | BODY MASS INDEX: 27.59 KG/M2 | SYSTOLIC BLOOD PRESSURE: 106 MMHG | DIASTOLIC BLOOD PRESSURE: 69 MMHG

## 2023-03-09 PROCEDURE — 42975 DISE EVAL SLP DO BRTH FLX DX: CPT | Performed by: OTOLARYNGOLOGY

## 2023-03-09 PROCEDURE — 25010000002 MIDAZOLAM PER 1 MG: Performed by: ANESTHESIOLOGY

## 2023-03-09 PROCEDURE — 25010000002 PROPOFOL 10 MG/ML EMULSION: Performed by: NURSE ANESTHETIST, CERTIFIED REGISTERED

## 2023-03-09 RX ORDER — FAMOTIDINE 10 MG/ML
20 INJECTION, SOLUTION INTRAVENOUS
Status: COMPLETED | OUTPATIENT
Start: 2023-03-09 | End: 2023-03-09

## 2023-03-09 RX ORDER — DIPHENHYDRAMINE HYDROCHLORIDE 50 MG/ML
12.5 INJECTION INTRAMUSCULAR; INTRAVENOUS
Status: DISCONTINUED | OUTPATIENT
Start: 2023-03-09 | End: 2023-03-09 | Stop reason: HOSPADM

## 2023-03-09 RX ORDER — ONDANSETRON 2 MG/ML
4 INJECTION INTRAMUSCULAR; INTRAVENOUS ONCE AS NEEDED
Status: DISCONTINUED | OUTPATIENT
Start: 2023-03-09 | End: 2023-03-09 | Stop reason: HOSPADM

## 2023-03-09 RX ORDER — OXYMETAZOLINE HYDROCHLORIDE 0.05 G/100ML
2 SPRAY NASAL 2 TIMES DAILY
Status: DISCONTINUED | OUTPATIENT
Start: 2023-03-09 | End: 2023-03-09 | Stop reason: HOSPADM

## 2023-03-09 RX ORDER — DROPERIDOL 2.5 MG/ML
0.62 INJECTION, SOLUTION INTRAMUSCULAR; INTRAVENOUS
Status: DISCONTINUED | OUTPATIENT
Start: 2023-03-09 | End: 2023-03-09 | Stop reason: HOSPADM

## 2023-03-09 RX ORDER — PROPOFOL 10 MG/ML
VIAL (ML) INTRAVENOUS AS NEEDED
Status: DISCONTINUED | OUTPATIENT
Start: 2023-03-09 | End: 2023-03-09 | Stop reason: SURG

## 2023-03-09 RX ORDER — NALOXONE HCL 0.4 MG/ML
0.2 VIAL (ML) INJECTION AS NEEDED
Status: DISCONTINUED | OUTPATIENT
Start: 2023-03-09 | End: 2023-03-09 | Stop reason: HOSPADM

## 2023-03-09 RX ORDER — FENTANYL CITRATE 50 UG/ML
50 INJECTION, SOLUTION INTRAMUSCULAR; INTRAVENOUS
Status: DISCONTINUED | OUTPATIENT
Start: 2023-03-09 | End: 2023-03-09 | Stop reason: HOSPADM

## 2023-03-09 RX ORDER — SODIUM CHLORIDE, SODIUM LACTATE, POTASSIUM CHLORIDE, CALCIUM CHLORIDE 600; 310; 30; 20 MG/100ML; MG/100ML; MG/100ML; MG/100ML
9 INJECTION, SOLUTION INTRAVENOUS CONTINUOUS PRN
Status: DISCONTINUED | OUTPATIENT
Start: 2023-03-09 | End: 2023-03-09 | Stop reason: HOSPADM

## 2023-03-09 RX ORDER — PROMETHAZINE HYDROCHLORIDE 12.5 MG/1
25 TABLET ORAL ONCE AS NEEDED
Status: DISCONTINUED | OUTPATIENT
Start: 2023-03-09 | End: 2023-03-09 | Stop reason: HOSPADM

## 2023-03-09 RX ORDER — PROMETHAZINE HYDROCHLORIDE 25 MG/1
25 SUPPOSITORY RECTAL ONCE AS NEEDED
Status: DISCONTINUED | OUTPATIENT
Start: 2023-03-09 | End: 2023-03-09 | Stop reason: HOSPADM

## 2023-03-09 RX ORDER — MIDAZOLAM HYDROCHLORIDE 1 MG/ML
2 INJECTION INTRAMUSCULAR; INTRAVENOUS ONCE
Status: COMPLETED | OUTPATIENT
Start: 2023-03-09 | End: 2023-03-09

## 2023-03-09 RX ORDER — FLUMAZENIL 0.1 MG/ML
0.2 INJECTION INTRAVENOUS AS NEEDED
Status: DISCONTINUED | OUTPATIENT
Start: 2023-03-09 | End: 2023-03-09 | Stop reason: HOSPADM

## 2023-03-09 RX ORDER — LIDOCAINE HYDROCHLORIDE 20 MG/ML
INJECTION, SOLUTION INFILTRATION; PERINEURAL AS NEEDED
Status: DISCONTINUED | OUTPATIENT
Start: 2023-03-09 | End: 2023-03-09 | Stop reason: SURG

## 2023-03-09 RX ADMIN — OXYMETAZOLINE HYDROCHLORIDE 2 SPRAY: 0.05 SPRAY NASAL at 10:52

## 2023-03-09 RX ADMIN — SODIUM CHLORIDE, POTASSIUM CHLORIDE, SODIUM LACTATE AND CALCIUM CHLORIDE 9 ML/HR: 600; 310; 30; 20 INJECTION, SOLUTION INTRAVENOUS at 11:10

## 2023-03-09 RX ADMIN — LIDOCAINE HYDROCHLORIDE 60 MG: 20 INJECTION, SOLUTION INFILTRATION; PERINEURAL at 12:03

## 2023-03-09 RX ADMIN — OXYMETAZOLINE HYDROCHLORIDE 2 SPRAY: 0.05 SPRAY NASAL at 11:58

## 2023-03-09 RX ADMIN — FAMOTIDINE 20 MG: 10 INJECTION, SOLUTION INTRAVENOUS at 11:08

## 2023-03-09 RX ADMIN — PROPOFOL 10 MG: 10 INJECTION, EMULSION INTRAVENOUS at 12:03

## 2023-03-09 RX ADMIN — MIDAZOLAM 2 MG: 1 INJECTION INTRAMUSCULAR; INTRAVENOUS at 11:08

## 2023-03-09 RX ADMIN — PROPOFOL 100 MCG/KG/MIN: 10 INJECTION, EMULSION INTRAVENOUS at 12:02

## 2023-03-09 NOTE — OP NOTE
Preop diagnosis: Obstructive sleep apnea, moderate, intolerant of positive pressure therapy     Postop diagnosis: Same     Seizure: Drug-induced sleep endoscopy     Surgeon: Walker     Findings: No evidence of concentric or lateral wall collapse     Specimen: None     Blood loss: None     Locations: None     Description: Patient was placed supine on the operating table where slow drip anesthesia was administered per drug-induced sleep endoscopy protocol is developed by [facility].     As the patient developed a slight snore, we submitted the 0 degree flexible fiberoptic endoscope through the nasal passage.  The scope was placed high in the nasopharynx for inferiorly directed view of the vellum and pharynx.     We observe several cycles of breathing and light snoring without evidence of lateral wall collapse or concentric collapse.     Based on these observations, the procedure was terminated.  Video documentation had been obtained.     She was awake and returned to recovery.

## 2023-03-09 NOTE — ANESTHESIA POSTPROCEDURE EVALUATION
Patient: Jaime Ruano    Procedure Summary     Date: 03/09/23 Room / Location: [facility] OR 04 / [facility]    Anesthesia Start: 1200 Anesthesia Stop: 1216    Procedure: EVALUATION OF SLEEP DISORDERED BREATHING BY EXAMINATION OF UPPER AIRWAY USING AN ENDOSCOPE Diagnosis:       Obstructive sleep apnea (adult) (pediatric)      (Obstructive sleep apnea (adult) (pediatric) [G47.33])    Surgeons: Allen Cardoso MD Provider: Otis Valadez MD    Anesthesia Type: general ASA Status: 2          Anesthesia Type: general    Vitals  Vitals Value Taken Time   /69 03/09/23 1215   Temp 36.5 °C (97.7 °F) 03/09/23 1215   Pulse 72 03/09/23 1215   Resp 16 03/09/23 1215   SpO2 95 % 03/09/23 1215           Post Anesthesia Care and Evaluation    Patient location during evaluation: bedside  Patient participation: complete - patient participated  Level of consciousness: responsive to light touch, responsive to verbal stimuli and sleepy but conscious  Pain score: 0  Pain management: adequate    Airway patency: patent  Anesthetic complications: No anesthetic complications  PONV Status: none  Cardiovascular status: acceptable and hemodynamically stable  Respiratory status: acceptable  Hydration status: acceptable

## 2023-03-09 NOTE — ANESTHESIA PREPROCEDURE EVALUATION
Anesthesia Evaluation     Patient summary reviewed and Nursing notes reviewed   no history of anesthetic complications:  NPO Solid Status: > 8 hours  NPO Liquid Status: > 2 hours           Airway   Mallampati: II  TM distance: >3 FB  Neck ROM: full  no difficulty expected  Dental - normal exam     Pulmonary - normal exam   (+) pulmonary embolism, sleep apnea,   (-) COPD, asthma, not a smoker, lung cancer  Cardiovascular - normal exam  Exercise tolerance: excellent (>7 METS)    ECG reviewed  Rhythm: regular  Rate: normal    (+) DVT resolved,   (-) hypertension, valvular problems/murmurs, past MI, CAD, dysrhythmias, angina, CHF, cardiac stents, CABG      Neuro/Psych- negative ROS  (-) seizures, TIA, CVA  GI/Hepatic/Renal/Endo    (+)   thyroid problem hypothyroidism  (-) hiatal hernia, GERD, PUD, hepatitis, liver disease, no renal disease, diabetes, GI bleed    Musculoskeletal (-) negative ROS    Abdominal  - normal exam   Substance History - negative use     OB/GYN          Other - negative ROS                       Anesthesia Plan    ASA 2     general     intravenous induction     Anesthetic plan, risks, benefits, and alternatives have been provided, discussed and informed consent has been obtained with: patient.    Plan discussed with CRNA.        CODE STATUS:

## 2023-03-09 NOTE — H&P
Patient Care Team:  Allen Rivera MD as PCP - General (Family Medicine)  Randal Fields MD as Consulting Physician (General Surgery)    Chief complaint I cannot use my CPAP    Subjective     Patient is a 57 y.o. male presents with moderate obstructive sleep apnea intolerant of CPAP. Onset of symptoms was gradual starting several years ago.  Symptoms are associated with daytime sleepiness and headache.  Symptoms are aggravated by upper respiratory illness.   Symptoms improve with nothing. Severity moderate context cardiorespiratory health quality not applicable    Review of Systems   Pertinent items are noted in HPI, all other systems reviewed and negative    History  Past Medical History:   Diagnosis Date   • Anesthesia complication     takes long time to wake up... trouble voiding after surgery    • Colovesical fistula 03/18/2020   • Diverticulosis    • DVT, lower extremity (HCC)    • Eczema    • Factor V Leiden (HCC)    • Gallstones 12/14/2022   • Pulmonary emboli (HCC)     2018   • Sleep apnea    • Thyroid nodule      Past Surgical History:   Procedure Laterality Date   • APPENDECTOMY     • CHOLECYSTECTOMY N/A 12/28/2022    Procedure: CHOLECYSTECTOMY LAPAROSCOPIC;  Surgeon: Randal Fields MD;  Location: [facility] MAIN OR;  Service: General;  Laterality: N/A;   • COLON SURGERY     • COLON SURGERY  03/29/2020    sigmoid colectomy with fistula repair    • COLONOSCOPY  2010 2018   • COLOVESICAL FISTULA REPAIR LAPAROSCOPIC     • HEMORRHOIDECTOMY  2017   • HERNIA REPAIR     • THYROIDECTOMY Right 09/07/2021    Procedure: RIGHT THYROIDECTOMY WITH ISTHMUSECTOMY;  Surgeon: Guilherme Haskins MD;  Location: [facility] OR;  Service: ENT;  Laterality: Right;   • US GUIDED FINE NEEDLE ASPIRATION  02/06/2019   • US GUIDED FINE NEEDLE ASPIRATION  04/12/2021   • VASECTOMY       Family History   Problem Relation Age of Onset   • Cancer Sister    • Malig Hyperthermia Neg Hx      Social History     Tobacco Use    • Smoking status: Never   • Smokeless tobacco: Never   Vaping Use   • Vaping Use: Never used   Substance Use Topics   • Alcohol use: Yes     Comment: 1 cocktail  daily, 3oz burbon w gingerale   • Drug use: Never     E-cigarette/Vaping   • E-cigarette/Vaping Use Never User      E-cigarette/Vaping Substances   • Nicotine No    • THC No    • CBD No    • Flavoring No      No medications prior to admission.     Allergies:  Amoxicillin, Heparin, and Tetracyclines & related    Objective     Vital Signs  Temp:  [97.7 °F (36.5 °C)-98 °F (36.7 °C)] 97.7 °F (36.5 °C)  Heart Rate:  [53-72] 53  Resp:  [16] 16  BP: (104-116)/(69-77) 106/69    Physical Exam:    No exam performed today,    Results Review:    None    Assessment & Plan       * No active hospital problems. *      Impression: Moderate obstructive sleep apnea intolerant of CPAP    Plan: Drug-induced sleep endoscopy    I discussed the patients findings and my recommendations with patient.     Allen Cardoso MD  03/09/23  14:25 EST    Time: 10 minutes

## 2023-06-12 ENCOUNTER — PRE-ADMISSION TESTING (OUTPATIENT)
Dept: PREADMISSION TESTING | Facility: HOSPITAL | Age: 58
End: 2023-06-12
Payer: COMMERCIAL

## 2023-06-12 VITALS
HEART RATE: 54 BPM | HEIGHT: 67 IN | DIASTOLIC BLOOD PRESSURE: 78 MMHG | BODY MASS INDEX: 28.09 KG/M2 | RESPIRATION RATE: 16 BRPM | TEMPERATURE: 98.2 F | OXYGEN SATURATION: 99 % | WEIGHT: 179 LBS | SYSTOLIC BLOOD PRESSURE: 122 MMHG

## 2023-06-12 LAB
ANION GAP SERPL CALCULATED.3IONS-SCNC: 7 MMOL/L (ref 5–15)
BUN SERPL-MCNC: 14 MG/DL (ref 6–20)
BUN/CREAT SERPL: 15.2 (ref 7–25)
CALCIUM SPEC-SCNC: 8.5 MG/DL (ref 8.6–10.5)
CHLORIDE SERPL-SCNC: 105 MMOL/L (ref 98–107)
CO2 SERPL-SCNC: 27 MMOL/L (ref 22–29)
CREAT SERPL-MCNC: 0.92 MG/DL (ref 0.76–1.27)
DEPRECATED RDW RBC AUTO: 45.5 FL (ref 37–54)
EGFRCR SERPLBLD CKD-EPI 2021: 95.8 ML/MIN/1.73
ERYTHROCYTE [DISTWIDTH] IN BLOOD BY AUTOMATED COUNT: 12.6 % (ref 12.3–15.4)
GLUCOSE SERPL-MCNC: 93 MG/DL (ref 65–99)
HCT VFR BLD AUTO: 42.3 % (ref 37.5–51)
HGB BLD-MCNC: 14.6 G/DL (ref 13–17.7)
MCH RBC QN AUTO: 33.2 PG (ref 26.6–33)
MCHC RBC AUTO-ENTMCNC: 34.5 G/DL (ref 31.5–35.7)
MCV RBC AUTO: 96.1 FL (ref 79–97)
PLATELET # BLD AUTO: 193 10*3/MM3 (ref 140–450)
PMV BLD AUTO: 9.7 FL (ref 6–12)
POTASSIUM SERPL-SCNC: 4.3 MMOL/L (ref 3.5–5.2)
RBC # BLD AUTO: 4.4 10*6/MM3 (ref 4.14–5.8)
SODIUM SERPL-SCNC: 139 MMOL/L (ref 136–145)
WBC NRBC COR # BLD: 5.97 10*3/MM3 (ref 3.4–10.8)

## 2023-06-12 PROCEDURE — 80048 BASIC METABOLIC PNL TOTAL CA: CPT

## 2023-06-12 PROCEDURE — 85027 COMPLETE CBC AUTOMATED: CPT

## 2023-06-12 PROCEDURE — 36415 COLL VENOUS BLD VENIPUNCTURE: CPT

## 2023-06-12 NOTE — DISCHARGE INSTRUCTIONS
Take the following medications the morning of surgery:  NONE    Arrive to hospital on your day of surgery at 5:30 AM.    If you are on prescription narcotic pain medication to control your pain you may also take that medication the morning of surgery.    General Instructions:  Do not eat solid food after midnight the night before surgery.  You may drink clear liquids day of surgery but must stop at least one hour before your hospital arrival time.  It is beneficial for you to have a clear drink that contains carbohydrates the day of surgery.  We suggest a 12 to 20 ounce bottle of Gatorade or Powerade for non-diabetic patients or a 12 to 20 ounce bottle of G2 or Powerade Zero for diabetic patients. (Pediatric patients, are not advised to drink a 12 to 20 ounce carbohydrate drink)    Clear liquids are liquids you can see through.  Nothing red in color.     Plain water                               Sports drinks  Sodas                                   Gelatin (Jell-O)  Fruit juices without pulp such as white grape juice and apple juice  Popsicles that contain no fruit or yogurt  Tea or coffee (no cream or milk added)  Gatorade / Powerade  G2 / Powerade Zero    Infants may have breast milk up to four hours before surgery.  Infants drinking formula may drink formula up to six hours before surgery.   Patients who avoid smoking, chewing tobacco and alcohol for 4 weeks prior to surgery have a reduced risk of post-operative complications.  Quit smoking as many days before surgery as you can.  Do not smoke, use chewing tobacco or drink alcohol the day of surgery.   If applicable bring your C-PAP/ BI-PAP machine in with you to preop day of surgery.  Bring any papers given to you in the doctor’s office.  Wear clean comfortable clothes.  Do not wear contact lenses, false eyelashes or make-up.  Bring a case for your glasses.   Bring crutches or walker if applicable.  Remove all piercings.  Leave jewelry and any other valuables  at home.  Hair extensions with metal clips must be removed prior to surgery.  The Pre-Admission Testing nurse will instruct you to bring medications if unable to obtain an accurate list in Pre-Admission Testing.        If you were given a blood bank ID arm band remember to bring it with you the day of surgery.    Preventing a Surgical Site Infection:  For 2 to 3 days before surgery, avoid shaving with a razor because the razor can irritate skin and make it easier to develop an infection.    Any areas of open skin can increase the risk of a post-operative wound infection by allowing bacteria to enter and travel throughout the body.  Notify your surgeon if you have any skin wounds / rashes even if it is not near the expected surgical site.  The area will need assessed to determine if surgery should be delayed until it is healed.  The night prior to surgery shower using a fresh bar of anti-bacterial soap (such as Dial) and clean washcloth.  Sleep in a clean bed with clean clothing.  Do not allow pets to sleep with you.  Shower on the morning of surgery using a fresh bar of anti-bacterial soap (such as Dial) and clean washcloth.  Dry with a clean towel and dress in clean clothing.  Ask your surgeon if you will be receiving antibiotics prior to surgery.  Make sure you, your family, and all healthcare providers clean their hands with soap and water or an alcohol based hand  before caring for you or your wound.    Day of surgery:  Your arrival time is approximately two hours before your scheduled surgery time.  Upon arrival, a Pre-op nurse and Anesthesiologist will review your health history, obtain vital signs, and answer questions you may have.  The only belongings needed at this time will be a list of your home medications and if applicable your C-PAP/BI-PAP machine.  A Pre-op nurse will start an IV and you may receive medication in preparation for surgery, including something to help you relax.     Please be  aware that surgery does come with discomfort.  We want to make every effort to control your discomfort so please discuss any uncontrolled symptoms with your nurse.   Your doctor will most likely have prescribed pain medications.      If you are going home after surgery you will receive individualized written care instructions before being discharged.  A responsible adult must drive you to and from the hospital on the day of your surgery and stay with you for 24 hours.  Discharge prescriptions can be filled by the hospital pharmacy during regular pharmacy hours.  If you are having surgery late in the day/evening your prescription may be e-prescribed to your pharmacy.  Please verify your pharmacy hours or chose a 24 hour pharmacy to avoid not having access to your prescription because your pharmacy has closed for the day.    If you are staying overnight following surgery, you will be transported to your hospital room following the recovery period.  [facility] has all private rooms.    If you have any questions please call Pre-Admission Testing at [phone].  Deductibles and co-payments are collected on the day of service. Please be prepared to pay the required co-pay, deductible or deposit on the day of service as defined by your plan.    Call your surgeon immediately if you experience any of the following symptoms:  Sore Throat  Shortness of Breath or difficulty breathing  Cough  Chills  Body soreness or muscle pain  Headache  Fever  New loss of taste or smell  Do not arrive for your surgery ill.  Your procedure will need to be rescheduled to another time.  You will need to call your physician before the day of surgery to avoid any unnecessary exposure to hospital staff as well as other patients.

## 2023-08-10 ENCOUNTER — OFFICE VISIT (OUTPATIENT)
Dept: SLEEP MEDICINE | Facility: HOSPITAL | Age: 58
End: 2023-08-10
Payer: COMMERCIAL

## 2023-08-10 VITALS — BODY MASS INDEX: 28.52 KG/M2 | HEART RATE: 61 BPM | HEIGHT: 67 IN | WEIGHT: 181.7 LBS | OXYGEN SATURATION: 95 %

## 2023-08-10 DIAGNOSIS — G47.33 OSA (OBSTRUCTIVE SLEEP APNEA): Primary | ICD-10-CM

## 2023-08-10 DIAGNOSIS — Z46.2 ENCOUNTER FOR INTERROGATION OF NEUROSTIMULATOR: ICD-10-CM

## 2023-08-10 DIAGNOSIS — E66.3 OVERWEIGHT WITH BODY MASS INDEX (BMI) 25.0-29.9: ICD-10-CM

## 2023-08-10 DIAGNOSIS — Z96.82 STATUS POST INSERTION OF HYPOGLOSSAL NERVE STIMULATOR: ICD-10-CM

## 2023-08-10 DIAGNOSIS — Z78.9 INTOLERANCE OF CONTINUOUS POSITIVE AIRWAY PRESSURE (CPAP) VENTILATION: ICD-10-CM

## 2023-08-10 PROCEDURE — G0463 HOSPITAL OUTPT CLINIC VISIT: HCPCS | Performed by: FAMILY MEDICINE

## 2023-08-10 RX ORDER — ASPIRIN 81 MG/1
81 TABLET ORAL DAILY
COMMUNITY

## 2023-08-10 NOTE — PROGRESS NOTES
"Follow Up Sleep Disorders Center Note     Chief Complaint:  LUCAS     Primary Care Physician: Allen Rivera MD    Jaime Ruano is a 58 y.o.male had a home sleep study in June 2022 which showed overall AHI of 29 events per hour.  Referred to Dr. Cardoso for hypoglossal nerve stimulator placement.  Presents today for activation.      Implantation date: 6/20/2023     Reports no tongue pain.  No healing issues with incisions.  No pain or drainage.     Functional capacity: 0.8 volts     Voltage range 0.8-1.8 V.  Start delay: 30 min  Duration time: 10 h  Pause time: 15 min    Current Medications:    Current Outpatient Medications:     aspirin 81 MG EC tablet, Take 1 tablet by mouth Daily., Disp: , Rfl:    also entered in Sleep Questionnaire    Patient  has a past medical history of Anesthesia complication, Colovesical fistula (03/18/2020), Diverticulosis, DVT, lower extremity, Eczema, Factor V Leiden, Gallstones (12/14/2022), Pulmonary emboli (2018), Sleep apnea, and Thyroid nodule.    Social History:    Social History     Socioeconomic History    Marital status:    Tobacco Use    Smoking status: Never    Smokeless tobacco: Never   Vaping Use    Vaping Use: Never used   Substance and Sexual Activity    Alcohol use: Yes     Comment: 1 cocktail  daily, 3oz bourbon w gingerale    Drug use: Never    Sexual activity: Yes     Partners: Female       Allergies:  Amoxicillin, Heparin, Tetracyclines & related, and Hydrocodone-acetaminophen    Vital Signs:    Vitals:    08/10/23 1100   Pulse: 61   SpO2: 95%   Weight: 82.4 kg (181 lb 11.2 oz)   Height: 170.2 cm (67.01\")     Body mass index is 28.45 kg/mý.    REVIEW OF SYSTEMS.  Full review of systems available on the intake form which is scanned in the media tab.  The relevant positive are noted below  Daytime excessive sleepiness with [place] Sleepiness Scale :Total score: 0   Snoring  All negative      Physical exam:  Vitals:    08/10/23 1100   Pulse: 61   SpO2: 95% " "  Weight: 82.4 kg (181 lb 11.2 oz)   Height: 170.2 cm (67.01\")    Body mass index is 28.45 kg/mý.    HEENT: Head is atraumatic, normocephalic  Eyes: pupils are round equal and reacting to light and accommodation, conjunctiva normal  Throat: tongue normal  RESPIRATORY SYSTEM: Regular respirations  CARDIOVASULAR SYSTEM: Regular rate  EXTREMITES: No cyanosis, clubbing  NEUROLOGICAL SYSTEM: Oriented x 3, no gross motor defects, gait normal    Impression:  1. LUCAS (obstructive sleep apnea)    2. Intolerance of continuous positive airway pressure (CPAP) ventilation    3. Status post insertion of hypoglossal nerve stimulator    4. Overweight with body mass index (BMI) 25.0-29.9    5. Encounter for interrogation of neurostimulator        Patient advised on how inspire works.  Device was tested today and activated today together with inspire representative Hilary.  Patient control set at 0.8-1.8 therapy duration 10 hours start delay 30 minutes pause time 15 minutes.  Functional amplitude resulting in tongue protrusion of the lower incisors was 0.8 V.  Patient instructed on how to operate sleep remote had a turn therapy on and off and had increased and decreased active stimulation.  Advised to use inspire nightly.  Return to clinic in 1 month for follow-up.    Sohan Dawson MD  Sleep Medicine  08/10/23  13:02 EDT      "

## 2023-08-12 ENCOUNTER — APPOINTMENT (OUTPATIENT)
Dept: CT IMAGING | Facility: HOSPITAL | Age: 58
End: 2023-08-12
Payer: COMMERCIAL

## 2023-08-12 ENCOUNTER — HOSPITAL ENCOUNTER (EMERGENCY)
Facility: HOSPITAL | Age: 58
Discharge: HOME | End: 2023-08-12
Attending: EMERGENCY MEDICINE
Payer: COMMERCIAL

## 2023-08-12 ENCOUNTER — APPOINTMENT (OUTPATIENT)
Dept: GENERAL RADIOLOGY | Facility: HOSPITAL | Age: 58
End: 2023-08-12
Payer: COMMERCIAL

## 2023-08-12 VITALS
TEMPERATURE: 98.1 F | RESPIRATION RATE: 20 BRPM | OXYGEN SATURATION: 95 % | HEART RATE: 63 BPM | SYSTOLIC BLOOD PRESSURE: 117 MMHG | DIASTOLIC BLOOD PRESSURE: 77 MMHG

## 2023-08-12 DIAGNOSIS — R05.1 ACUTE COUGH: Primary | ICD-10-CM

## 2023-08-12 DIAGNOSIS — R07.9 NONSPECIFIC CHEST PAIN: ICD-10-CM

## 2023-08-12 DIAGNOSIS — I82.462 ACUTE DEEP VEIN THROMBOSIS (DVT) OF CALF MUSCLE VEIN OF LEFT LOWER EXTREMITY: ICD-10-CM

## 2023-08-12 LAB
ALBUMIN SERPL-MCNC: 3.9 G/DL (ref 3.5–5.2)
ALBUMIN/GLOB SERPL: 1.4 G/DL
ALP SERPL-CCNC: 68 U/L (ref 39–117)
ALT SERPL W P-5'-P-CCNC: 19 U/L (ref 1–41)
ANION GAP SERPL CALCULATED.3IONS-SCNC: 10.5 MMOL/L (ref 5–15)
AST SERPL-CCNC: 19 U/L (ref 1–40)
BASOPHILS # BLD AUTO: 0.09 10*3/MM3 (ref 0–0.2)
BASOPHILS NFR BLD AUTO: 1.3 % (ref 0–1.5)
BILIRUB SERPL-MCNC: 0.5 MG/DL (ref 0–1.2)
BUN SERPL-MCNC: 10 MG/DL (ref 8–23)
BUN/CREAT SERPL: 9.6 (ref 7–25)
CALCIUM SPEC-SCNC: 8.5 MG/DL (ref 8.6–10.5)
CHLORIDE SERPL-SCNC: 106 MMOL/L (ref 98–107)
CO2 SERPL-SCNC: 23.5 MMOL/L (ref 22–29)
CREAT SERPL-MCNC: 1.04 MG/DL (ref 0.76–1.27)
DEPRECATED RDW RBC AUTO: 43.6 FL (ref 37–54)
EGFRCR SERPLBLD CKD-EPI 2021: 82.2 ML/MIN/1.73
EOSINOPHIL # BLD AUTO: 0.31 10*3/MM3 (ref 0–0.4)
EOSINOPHIL NFR BLD AUTO: 4.4 % (ref 0.3–6.2)
ERYTHROCYTE [DISTWIDTH] IN BLOOD BY AUTOMATED COUNT: 12.7 % (ref 12.3–15.4)
GLOBULIN UR ELPH-MCNC: 2.7 GM/DL
GLUCOSE SERPL-MCNC: 108 MG/DL (ref 65–99)
HCT VFR BLD AUTO: 39.6 % (ref 37.5–51)
HGB BLD-MCNC: 14.2 G/DL (ref 13–17.7)
HOLD SPECIMEN: NORMAL
HOLD SPECIMEN: NORMAL
IMM GRANULOCYTES # BLD AUTO: 0.03 10*3/MM3 (ref 0–0.05)
IMM GRANULOCYTES NFR BLD AUTO: 0.4 % (ref 0–0.5)
LYMPHOCYTES # BLD AUTO: 2.74 10*3/MM3 (ref 0.7–3.1)
LYMPHOCYTES NFR BLD AUTO: 38.7 % (ref 19.6–45.3)
MCH RBC QN AUTO: 33.4 PG (ref 26.6–33)
MCHC RBC AUTO-ENTMCNC: 35.9 G/DL (ref 31.5–35.7)
MCV RBC AUTO: 93.2 FL (ref 79–97)
MONOCYTES # BLD AUTO: 0.84 10*3/MM3 (ref 0.1–0.9)
MONOCYTES NFR BLD AUTO: 11.9 % (ref 5–12)
NEUTROPHILS NFR BLD AUTO: 3.07 10*3/MM3 (ref 1.7–7)
NEUTROPHILS NFR BLD AUTO: 43.3 % (ref 42.7–76)
NRBC BLD AUTO-RTO: 0 /100 WBC (ref 0–0.2)
PLATELET # BLD AUTO: 202 10*3/MM3 (ref 140–450)
PMV BLD AUTO: 9.4 FL (ref 6–12)
POTASSIUM SERPL-SCNC: 3.8 MMOL/L (ref 3.5–5.2)
PROT SERPL-MCNC: 6.6 G/DL (ref 6–8.5)
RBC # BLD AUTO: 4.25 10*6/MM3 (ref 4.14–5.8)
SODIUM SERPL-SCNC: 140 MMOL/L (ref 136–145)
WBC NRBC COR # BLD: 7.08 10*3/MM3 (ref 3.4–10.8)
WHOLE BLOOD HOLD COAG: NORMAL
WHOLE BLOOD HOLD SPECIMEN: NORMAL

## 2023-08-12 PROCEDURE — 99285 EMERGENCY DEPT VISIT HI MDM: CPT

## 2023-08-12 PROCEDURE — 80053 COMPREHEN METABOLIC PANEL: CPT | Performed by: EMERGENCY MEDICINE

## 2023-08-12 PROCEDURE — 36415 COLL VENOUS BLD VENIPUNCTURE: CPT

## 2023-08-12 PROCEDURE — 93005 ELECTROCARDIOGRAM TRACING: CPT | Performed by: EMERGENCY MEDICINE

## 2023-08-12 PROCEDURE — 85025 COMPLETE CBC W/AUTO DIFF WBC: CPT | Performed by: EMERGENCY MEDICINE

## 2023-08-12 PROCEDURE — 71045 X-RAY EXAM CHEST 1 VIEW: CPT

## 2023-08-12 PROCEDURE — 71260 CT THORAX DX C+: CPT

## 2023-08-12 PROCEDURE — 25510000001 IOPAMIDOL PER 1 ML: Performed by: EMERGENCY MEDICINE

## 2023-08-12 RX ORDER — ALBUTEROL SULFATE 90 UG/1
2 AEROSOL, METERED RESPIRATORY (INHALATION) EVERY 4 HOURS PRN
Qty: 18 G | Refills: 0 | Status: SHIPPED | OUTPATIENT
Start: 2023-08-12

## 2023-08-12 RX ADMIN — IOPAMIDOL 100 ML: 755 INJECTION, SOLUTION INTRAVENOUS at 21:16

## 2023-08-13 LAB — QT INTERVAL: 427 MS

## 2023-08-13 NOTE — ED PROVIDER NOTES
Time: 8:09 PM EDT  Date of encounter:  8/12/2023  Independent Historian/Clinical History and Information was obtained by:   Patient    History is limited by: N/A    Chief Complaint: Cough, leg swelling/pain and chest pain      History of Present Illness:  Patient is a 58 y.o. year old male who presents to the emergency department for evaluation of cough for the past 3 weeks.  Right-sided chest discomfort starting yesterday that is worse with cough.  New left calf edema and pain starting today that was quite impressive earlier but has now improved.  Family states there was even skin discoloration but that has also resolved.  Afebrile in the last few days.  Has been on azithromycin per primary care for this 3-week cough.  Has history of DVT/PE in 2018 but only on aspirin since then and no return.  Patient does feel like this is similar to past DVT/pulmonary embolism.    HPI    Patient Care Team  Primary Care Provider: Allen Rivera MD    Past Medical History:     Allergies   Allergen Reactions    Amoxicillin GI Intolerance     Diarrhea     Heparin Itching     Flush feeling    Tetracyclines & Related Other (See Comments)     Reaction unknown    Hydrocodone-Acetaminophen Itching     Feeling like something is crawling on him     Past Medical History:   Diagnosis Date    Anesthesia complication     takes long time to wake up... trouble voiding after surgery     Colovesical fistula 03/18/2020    Diverticulosis     DVT, lower extremity     HX LEFT LEG    Eczema     Factor V Leiden     Gallstones 12/14/2022    Pulmonary emboli 2018    Sleep apnea     Thyroid nodule     BENIGN, PARTIAL THYROIDECTOMY     Past Surgical History:   Procedure Laterality Date    APPENDECTOMY      CHOLECYSTECTOMY N/A 12/28/2022    Procedure: CHOLECYSTECTOMY LAPAROSCOPIC;  Surgeon: Randal Fields MD;  Location: [facility];  Service: General;  Laterality: N/A;    COLON SURGERY      COLON SURGERY  03/29/2020    sigmoid colectomy with  fistula repair     COLONOSCOPY  2010 2018    COLOVESICAL FISTULA REPAIR LAPAROSCOPIC      HEMORRHOIDECTOMY  2017    HERNIA REPAIR      WITH MESH    SLEEP ENDOSCOPY N/A 03/09/2023    Procedure: EVALUATION OF SLEEP DISORDERED BREATHING BY EXAMINATION OF UPPER AIRWAY USING AN ENDOSCOPE;  Surgeon: Allen Cardoso MD;  Location: [facility] MAIN OR;  Service: ENT;  Laterality: N/A;    THYROIDECTOMY Right 09/07/2021    Procedure: RIGHT THYROIDECTOMY WITH ISTHMUSECTOMY;  Surgeon: Guilherme Haskins MD;  Location: [facility] MAIN OR;  Service: ENT;  Laterality: Right;    US GUIDED FINE NEEDLE ASPIRATION  02/06/2019    US GUIDED FINE NEEDLE ASPIRATION  04/12/2021    VASECTOMY       Family History   Problem Relation Age of Onset    Cancer Sister     Dewey Hyperthermia Neg Hx        Home Medications:  Prior to Admission medications    Medication Sig Start Date End Date Taking? Authorizing Provider   aspirin 81 MG EC tablet Take 1 tablet by mouth Daily.    Provider, MD Cholo        Social History:   Social History     Tobacco Use    Smoking status: Never    Smokeless tobacco: Never   Vaping Use    Vaping Use: Never used   Substance Use Topics    Alcohol use: Yes     Comment: 1 cocktail  daily, 3oz bourbon w gingerale    Drug use: Never         Review of Systems:  Review of Systems   Constitutional:  Negative for chills and fever.   HENT:  Negative for congestion, rhinorrhea and sore throat.    Eyes:  Negative for photophobia.   Respiratory:  Positive for cough. Negative for apnea, chest tightness and shortness of breath.    Cardiovascular:  Positive for chest pain and leg swelling. Negative for palpitations.   Gastrointestinal:  Negative for abdominal pain, diarrhea, nausea and vomiting.   Endocrine: Negative.    Genitourinary:  Negative for difficulty urinating and dysuria.   Musculoskeletal:  Negative for back pain, joint swelling and myalgias.   Skin:  Negative for color change and wound.   Allergic/Immunologic:  Negative.    Neurological:  Negative for seizures and headaches.   Psychiatric/Behavioral: Negative.     All other systems reviewed and are negative.     Physical Exam:  /77 (BP Location: Right arm, Patient Position: Lying)   Pulse 63   Temp 98.1 øF (36.7 øC)   Resp 20   SpO2 95%     Physical Exam  Vitals and nursing note reviewed.   Constitutional:       General: He is awake.      Appearance: Normal appearance.   HENT:      Head: Normocephalic and atraumatic.      Nose: Nose normal.      Mouth/Throat:      Mouth: Mucous membranes are moist.   Eyes:      Extraocular Movements: Extraocular movements intact.      Pupils: Pupils are equal, round, and reactive to light.   Cardiovascular:      Rate and Rhythm: Normal rate and regular rhythm.      Heart sounds: Normal heart sounds.   Pulmonary:      Effort: Pulmonary effort is normal. No respiratory distress.      Breath sounds: Normal breath sounds. No wheezing, rhonchi or rales.   Abdominal:      General: Bowel sounds are normal.      Palpations: Abdomen is soft.      Tenderness: There is no abdominal tenderness. There is no guarding or rebound.      Comments: No rigidity   Musculoskeletal:         General: No tenderness. Normal range of motion.      Cervical back: Normal range of motion and neck supple.      Right lower leg: No edema.      Left lower leg: No edema.   Skin:     General: Skin is warm and dry.      Coloration: Skin is not jaundiced.   Neurological:      General: No focal deficit present.      Mental Status: He is alert and oriented to person, place, and time. Mental status is at baseline.      Sensory: Sensation is intact.      Motor: Motor function is intact.      Coordination: Coordination is intact.   Psychiatric:         Attention and Perception: Attention and perception normal.         Mood and Affect: Mood and affect normal.         Speech: Speech normal.         Behavior: Behavior normal.         Judgment: Judgment normal.                 Procedures:  Procedures      Medical Decision Making:      Comorbidities that affect care:    DVT/PE/gallstones/diverticulosis    External Notes reviewed:    Encounter review: ENT surgery note 6/20/2023 for insertion of hypoglossal nerve neurostimulator.      The following orders were placed and all results were independently analyzed by me:  Orders Placed This Encounter   Procedures    XR Chest 1 View    CT Chest With Contrast Diagnostic    US venous doppler lower extremity left (duplex) - RAD READ    Golden Draw    Comprehensive Metabolic Panel    CBC Auto Differential    NPO Diet NPO Type: Strict NPO    Undress & Gown    ECG 12 Lead Chest Pain    ECG 12 Lead Chest Pain    CBC & Differential    Green Top (Gel)    Lavender Top    Gold Top - SST    Light Blue Top       Medications Given in the Emergency Department:  Medications   iopamidol (ISOVUE-370) 76 % injection 100 mL (100 mL Intravenous Given 8/12/23 2116)        ED Course:    ED Course as of 08/12/23 2204   Sat Aug 12, 2023   2157 Declines Eliquis.  Prefers to follow-up with outpatient ultrasound results. [RP]   2159 I have personally interpreted the EKG today and it shows no evidence of any acute ischemia or heart arrhythmia. [RP]      ED Course User Index  [RP] Agustin Heck MD       Labs:    Lab Results (last 24 hours)       Procedure Component Value Units Date/Time    CBC & Differential [[id number]]  (Abnormal) Collected: 08/12/23 2001    Specimen: Blood Updated: 08/12/23 2006    Narrative:      The following orders were created for panel order CBC & Differential.  Procedure                               Abnormality         Status                     ---------                               -----------         ------                     CBC Auto Differential[[id number]]        Abnormal            Final result                 Please view results for these tests on the individual orders.    Comprehensive Metabolic Panel [[id number]]  (Abnormal)  Collected: 08/12/23 2001    Specimen: Blood Updated: 08/12/23 2030     Glucose 108 mg/dL      BUN 10 mg/dL      Creatinine 1.04 mg/dL      Sodium 140 mmol/L      Potassium 3.8 mmol/L      Comment: Slight hemolysis detected by analyzer. Results may be affected.        Chloride 106 mmol/L      CO2 23.5 mmol/L      Calcium 8.5 mg/dL      Total Protein 6.6 g/dL      Albumin 3.9 g/dL      ALT (SGPT) 19 U/L      AST (SGOT) 19 U/L      Alkaline Phosphatase 68 U/L      Total Bilirubin 0.5 mg/dL      Globulin 2.7 gm/dL      A/G Ratio 1.4 g/dL      BUN/Creatinine Ratio 9.6     Anion Gap 10.5 mmol/L      eGFR 82.2 mL/min/1.73     Narrative:      GFR Normal >60  Chronic Kidney Disease <60  Kidney Failure <15      CBC Auto Differential [[id number]]  (Abnormal) Collected: 08/12/23 2001    Specimen: Blood Updated: 08/12/23 2006     WBC 7.08 10*3/mm3      RBC 4.25 10*6/mm3      Hemoglobin 14.2 g/dL      Hematocrit 39.6 %      MCV 93.2 fL      MCH 33.4 pg      MCHC 35.9 g/dL      RDW 12.7 %      RDW-SD 43.6 fl      MPV 9.4 fL      Platelets 202 10*3/mm3      Neutrophil % 43.3 %      Lymphocyte % 38.7 %      Monocyte % 11.9 %      Eosinophil % 4.4 %      Basophil % 1.3 %      Immature Grans % 0.4 %      Neutrophils, Absolute 3.07 10*3/mm3      Lymphocytes, Absolute 2.74 10*3/mm3      Monocytes, Absolute 0.84 10*3/mm3      Eosinophils, Absolute 0.31 10*3/mm3      Basophils, Absolute 0.09 10*3/mm3      Immature Grans, Absolute 0.03 10*3/mm3      nRBC 0.0 /100 WBC              Imaging:    CT Chest With Contrast Diagnostic    Result Date: 8/12/2023  PROCEDURE: CT CHEST W CONTRAST DIAGNOSTIC  COMPARISON:  [facility], CT, CT CHEST PULMONARY EMBOLISM, 11/27/2021, 15:19.  [facility], CR, XR CHEST 1 VW, 8/12/2023, 20:08.  [facility], CT, CT ABDOMEN PELVIS W CONTRAST, 11/03/2022, 9:34. INDICATIONS: Right chest pain, coughing  TECHNIQUE: After obtaining the patient's consent, CT images were obtained  with non-ionic intravenous contrast material.   PROTOCOL:   Standard imaging protocol performed    RADIATION:   DLP: 385.2 mGy*cm   Automated exposure control was utilized to minimize radiation dose. CONTRAST: 100 cc  FINDINGS:  The central tracheobronchial tree is clear.  The lungs are clear.  There is no pleural effusion.  A right vagal stimulator device is noted.  The heart size appears normal.  The great vessels are normal in caliber.  There is no evidence of pulmonary embolus.  No abnormally enlarged lymph nodes are identified.  Partial evaluation of the upper abdomen demonstrates changes of cholecystectomy.  No aggressive osseous lesions are identified.        1. Negative for pulmonary embolus. 2. No acute cardiopulmonary process.     PRICILA LOPEZ MD       Electronically Signed and Approved By: PRICILA LOPEZ MD on 8/12/2023 at 21:41             XR Chest 1 View    Result Date: 8/12/2023  PROCEDURE: XR CHEST 1 VW  COMPARISON: [facility], CR, XR CHEST 2 VW, 11/27/2021, 12:23.  INDICATIONS: PERSISTENT COUGH, LEFT LOWER EXTREMITY SWELLING X 3 WEEKS.  FINDINGS:  LUNGS: Normal.  No significant pulmonary parenchymal abnormalities.  VASCULATURE: Normal.  Unremarkable pulmonary vasculature.  CARDIAC: Normal.  No cardiac silhouette abnormality or cardiomegaly.  MEDIASTINUM: Normal.  No visible mass or adenopathy.  PLEURA: Normal.  No effusion or pleural thickening.  BONES: Normal.  No fracture or visible bony lesion.  OTHER: An electronic device overlies the right chest.       No acute cardiopulmonary process identified.       PRICILA LOPEZ MD       Electronically Signed and Approved By: PRICILA LOPEZ MD on 8/12/2023 at 20:19                Differential Diagnosis and Discussion:    Chest Pain:  Based on the patient's signs and symptoms, I considered aortic dissection, myocardial infaction, pulmonary embolism, cardiac tamponade, pericarditis, pneumothorax, musculoskeletal chest pain and other differential  diagnosis as an etiology of the patient's chest pain.   Cough: Differential diagnosis includes but is not limited to pneumonia, acute bronchitis, upper respiratory infection, ACE inhibitor use, allergic reaction, epiglottitis, seasonal allergies, chemical irritants, exercise-induced asthma, viral syndrome.    All labs were reviewed and interpreted by me.  All X-rays impressions were independently interpreted by me.  EKG was interpreted by me.  CT scan radiology impression was interpreted by me.    MDM     Amount and/or Complexity of Data Reviewed  Clinical lab tests: reviewed  Tests in the radiology section of [facility]: reviewed  Tests in the medicine section of [facility]: reviewed             Patient Care Considerations:    Duplex ultrasound: I initially ordered this study, however vascular tech is not in-house on the weekends so this will have to be changed to an outpatient order.      Consultants/Shared Management Plan:    None    Social Determinants of Health:    Patient is independent, reliable, and has access to care.       Disposition and Care Coordination:    Discharged: The patient is suitable and stable for discharge with no need for consideration of observation or admission.    I have explained the patient's condition, diagnoses and treatment plan based on the information available to me at this time. I have answered questions and addressed any concerns. The patient has a good  understanding of the patient's diagnosis, condition, and treatment plan as can be expected at this point. The vital signs have been stable. The patient's condition is stable and appropriate for discharge from the emergency department.      The patient will pursue further outpatient evaluation with the primary care physician or other designated or consulting physician as outlined in the discharge instructions. They are agreeable to this plan of care and follow-up instructions have been explained in detail. The patient has received these  instructions in written format and have expressed an understanding of the discharge instructions. The patient is aware that any significant change in condition or worsening of symptoms should prompt an immediate return to this or the closest emergency department or call to 911.    Final diagnoses:   Acute cough   Nonspecific chest pain   Acute deep vein thrombosis (DVT) of calf muscle vein of left lower extremity        ED Disposition       ED Disposition   Discharge    Condition   Stable    Comment   --               This medical record created using voice recognition software.             Agustin Heck MD  08/12/23 [id number]

## 2023-08-13 NOTE — DISCHARGE INSTRUCTIONS
Return to emergency department immediately for any worsening of symptoms.  Follow-up with the outpatient Doppler ultrasound to rule out DVT this coming week.

## 2023-10-05 ENCOUNTER — OFFICE VISIT (OUTPATIENT)
Dept: SLEEP MEDICINE | Facility: HOSPITAL | Age: 58
End: 2023-10-05
Payer: COMMERCIAL

## 2023-10-05 VITALS — HEIGHT: 67 IN | HEART RATE: 59 BPM | WEIGHT: 181 LBS | OXYGEN SATURATION: 96 % | BODY MASS INDEX: 28.41 KG/M2

## 2023-10-05 DIAGNOSIS — E66.3 OVERWEIGHT WITH BODY MASS INDEX (BMI) 25.0-29.9: ICD-10-CM

## 2023-10-05 DIAGNOSIS — Z96.82 STATUS POST INSERTION OF HYPOGLOSSAL NERVE STIMULATOR: ICD-10-CM

## 2023-10-05 DIAGNOSIS — Z78.9 INTOLERANCE OF CONTINUOUS POSITIVE AIRWAY PRESSURE (CPAP) VENTILATION: ICD-10-CM

## 2023-10-05 DIAGNOSIS — Z46.2 ENCOUNTER FOR INTERROGATION OF NEUROSTIMULATOR: ICD-10-CM

## 2023-10-05 DIAGNOSIS — G47.33 OSA (OBSTRUCTIVE SLEEP APNEA): Primary | ICD-10-CM

## 2023-10-05 PROCEDURE — G0463 HOSPITAL OUTPT CLINIC VISIT: HCPCS

## 2023-10-05 NOTE — PROGRESS NOTES
"Follow Up [facility] Note     Chief Complaint:  LUCAS     Primary Care Physician: Allen Rivera MD    Jaime Ruano is a 58 y.o.male had a home sleep study in June 2022 which showed overall AHI of 29 events per hour.  Referred to Dr. Cardoso for hypoglossal nerve stimulator placement.  Presents today for activation.      Implantation date: 6/20/2023     Reports no tongue pain.  No healing issues with incisions.  No pain or drainage.     Functional capacity: 0.8 volts     Voltage range 0.8-1.8 V.  Start delay: 30 min  Duration time: 10 h  Pause time: 15 min    10/5/2023: Patient presents today for 1 month follow-up since activation of inspire device.    Current Medications:    Current Outpatient Medications:     aspirin 81 MG EC tablet, Take 1 tablet by mouth Daily., Disp: , Rfl:    also entered in Sleep Questionnaire    Patient  has a past medical history of Anesthesia complication, Colovesical fistula (03/18/2020), Diverticulosis, DVT, lower extremity, Eczema, Factor V Leiden, Gallstones (12/14/2022), Pulmonary emboli (2018), Sleep apnea, and Thyroid nodule.    Social History:    Social History     Socioeconomic History    Marital status:    Tobacco Use    Smoking status: Never    Smokeless tobacco: Never   Vaping Use    Vaping Use: Never used   Substance and Sexual Activity    Alcohol use: Yes     Comment: 1 cocktail  daily, 3oz bourbon w gingerale    Drug use: Never    Sexual activity: Yes     Partners: Female       Allergies:  Amoxicillin, Heparin, Tetracyclines & related, and Hydrocodone-acetaminophen    Vital Signs:    Vitals:    10/05/23 0900   Pulse: 59   SpO2: 96%   Weight: 82.1 kg (181 lb)   Height: 170.2 cm (67.01\")       Body mass index is 28.34 kg/m².    REVIEW OF SYSTEMS.  Full review of systems available on the intake form which is scanned in the media tab.  The relevant positive are noted below  Daytime excessive sleepiness with [place] Sleepiness Scale :Total score: 0 " "  Snoring  All negative      Physical exam:  Vitals:    10/05/23 0900   Pulse: 59   SpO2: 96%   Weight: 82.1 kg (181 lb)   Height: 170.2 cm (67.01\")      Body mass index is 28.34 kg/m².    HEENT: Head is atraumatic, normocephalic  Eyes: pupils are round equal and reacting to light and accommodation, conjunctiva normal  Throat: tongue normal  RESPIRATORY SYSTEM: Regular respirations  CARDIOVASULAR SYSTEM: Regular rate  EXTREMITES: No cyanosis, clubbing  NEUROLOGICAL SYSTEM: Oriented x 3, no gross motor defects, gait normal    Impression:  1. LUCAS (obstructive sleep apnea)    2. Intolerance of continuous positive airway pressure (CPAP) ventilation    3. Status post insertion of hypoglossal nerve stimulator    4. Encounter for interrogation of neurostimulator    5. Overweight with body mass index (BMI) 25.0-29.9      Patient control set at 0.8-1.8 therapy duration 10 hours start delay 30 minutes pause time 15 minutes. Cont at current voltage settings  but inc Pause to 25 min. advised to use inspire nightly.  Return to clinic in 4 weeks for follow-up or sooner if needed.    Sohan Dawson MD  Sleep Medicine  10/05/23  10:37 EDT      "

## 2023-11-14 ENCOUNTER — OFFICE VISIT (OUTPATIENT)
Dept: SLEEP MEDICINE | Facility: HOSPITAL | Age: 58
End: 2023-11-14
Payer: COMMERCIAL

## 2023-11-14 VITALS — HEART RATE: 60 BPM | HEIGHT: 67 IN | BODY MASS INDEX: 29.07 KG/M2 | OXYGEN SATURATION: 95 % | WEIGHT: 185.2 LBS

## 2023-11-14 DIAGNOSIS — E66.3 OVERWEIGHT WITH BODY MASS INDEX (BMI) 25.0-29.9: ICD-10-CM

## 2023-11-14 DIAGNOSIS — Z96.82 STATUS POST INSERTION OF HYPOGLOSSAL NERVE STIMULATOR: ICD-10-CM

## 2023-11-14 DIAGNOSIS — Z78.9 INTOLERANCE OF CONTINUOUS POSITIVE AIRWAY PRESSURE (CPAP) VENTILATION: ICD-10-CM

## 2023-11-14 DIAGNOSIS — G47.33 OSA (OBSTRUCTIVE SLEEP APNEA): Primary | ICD-10-CM

## 2023-11-14 DIAGNOSIS — Z46.2 ENCOUNTER FOR INTERROGATION OF NEUROSTIMULATOR: ICD-10-CM

## 2023-11-14 PROCEDURE — G0463 HOSPITAL OUTPT CLINIC VISIT: HCPCS

## 2023-11-14 NOTE — PROGRESS NOTES
Follow Up [facility] Note     Chief Complaint:  LUCAS     Primary Care Physician: Allen Rivera MD    Jaime Ruano is a 58 y.o.male had a home sleep study in June 2022 which showed overall AHI of 29 events per hour.  Referred to Dr. Cardoso for hypoglossal nerve stimulator placement.  Presents today for activation.      Implantation date: 6/20/2023     Reports no tongue pain.  No healing issues with incisions.  No pain or drainage.     Functional capacity: 0.8 volts     Voltage range 0.8-1.8 V.  Start delay: 30 min  Duration time: 10 h  Pause time: 15 min    10/5/2023: Patient presents today for 1 month follow-up since activation of inspire device.    11/14/2023: At last visit we kept control settings at 0.8-1.8 V however we increased pause time to 25 minutes.  Doing well since last visit.  No hypersomnia or nonrestorative sleep no snoring.  Taking Tylenol PM before bed helps with arthritic aches and pains which wake him up.    Current Medications:    Current Outpatient Medications:     aspirin 81 MG EC tablet, Take 1 tablet by mouth Daily., Disp: , Rfl:    also entered in Sleep Questionnaire    Patient  has a past medical history of Anesthesia complication, Colovesical fistula (03/18/2020), Diverticulosis, DVT, lower extremity, Eczema, Factor V Leiden, Gallstones (12/14/2022), Pulmonary emboli (2018), Sleep apnea, and Thyroid nodule.    Social History:    Social History     Socioeconomic History    Marital status:    Tobacco Use    Smoking status: Never    Smokeless tobacco: Never   Vaping Use    Vaping Use: Never used   Substance and Sexual Activity    Alcohol use: Yes     Comment: 1 cocktail  daily, 3oz bourbon w gingerale    Drug use: Never    Sexual activity: Yes     Partners: Female       Allergies:  Amoxicillin, Heparin, Tetracyclines & related, and Hydrocodone-acetaminophen    Vital Signs:    Vitals:    11/14/23 0900   Pulse: 60   SpO2: 95%   Weight: 84 kg (185 lb 3.2 oz)   Height:  "170.2 cm (67.01\")       Body mass index is 29 kg/m².    REVIEW OF SYSTEMS.  Full review of systems available on the intake form which is scanned in the media tab.  The relevant positive are noted below  Daytime excessive sleepiness with [place] Sleepiness Scale :Total score: 0   Snoring  All negative      Physical exam:  Vitals:    11/14/23 0900   Pulse: 60   SpO2: 95%   Weight: 84 kg (185 lb 3.2 oz)   Height: 170.2 cm (67.01\")      Body mass index is 29 kg/m².    HEENT: Head is atraumatic, normocephalic  Eyes: pupils are round equal and reacting to light and accommodation, conjunctiva normal  Throat: tongue normal  RESPIRATORY SYSTEM: Regular respirations  CARDIOVASULAR SYSTEM: Regular rate  EXTREMITES: No cyanosis, clubbing  NEUROLOGICAL SYSTEM: Oriented x 3, no gross motor defects, gait normal    Impression:  1. LUCAS (obstructive sleep apnea)    2. Intolerance of continuous positive airway pressure (CPAP) ventilation    3. Status post insertion of hypoglossal nerve stimulator    4. Encounter for interrogation of neurostimulator    5. Overweight with body mass index (BMI) 25.0-29.9         Cont at current voltage settings.  Continue to use inspire nightly.  Try to further if tolerable.  Return to clinic in 4 weeks for follow-up or sooner if needed.  At next visit if continues to do well we can plan for home sleep study.    Sohan Dawson MD  Sleep Medicine  11/14/23  09:45 EST      "

## 2023-12-21 ENCOUNTER — OFFICE VISIT (OUTPATIENT)
Dept: SLEEP MEDICINE | Facility: HOSPITAL | Age: 58
End: 2023-12-21
Payer: COMMERCIAL

## 2023-12-21 VITALS — OXYGEN SATURATION: 94 % | HEART RATE: 68 BPM | HEIGHT: 67 IN | WEIGHT: 188.8 LBS | BODY MASS INDEX: 29.63 KG/M2

## 2023-12-21 DIAGNOSIS — E66.3 OVERWEIGHT WITH BODY MASS INDEX (BMI) 25.0-29.9: ICD-10-CM

## 2023-12-21 DIAGNOSIS — G47.33 OSA (OBSTRUCTIVE SLEEP APNEA): Primary | ICD-10-CM

## 2023-12-21 DIAGNOSIS — Z78.9 INTOLERANCE OF CONTINUOUS POSITIVE AIRWAY PRESSURE (CPAP) VENTILATION: ICD-10-CM

## 2023-12-21 DIAGNOSIS — Z96.82 STATUS POST INSERTION OF HYPOGLOSSAL NERVE STIMULATOR: ICD-10-CM

## 2023-12-21 DIAGNOSIS — Z46.2 ENCOUNTER FOR INTERROGATION OF NEUROSTIMULATOR: ICD-10-CM

## 2023-12-21 PROCEDURE — G0463 HOSPITAL OUTPT CLINIC VISIT: HCPCS

## 2023-12-21 NOTE — PROGRESS NOTES
Follow Up [facility] Note     Chief Complaint:  LUCAS     Primary Care Physician: Allen Rivera MD    Jaiem Ruano is a 58 y.o.male had a home sleep study in June 2022 which showed overall AHI of 29 events per hour.  Referred to Dr. Cardoso for hypoglossal nerve stimulator placement.  Presents today for activation.      Implantation date: 6/20/2023     Reports no tongue pain.  No healing issues with incisions.  No pain or drainage.     Functional capacity: 0.8 volts     Voltage range 0.8-1.8 V.  Start delay: 30 min  Duration time: 10 h  Pause time: 15 min    10/5/2023: Patient presents today for 1 month follow-up since activation of inspire device.    11/14/2023: At last visit we kept control settings at 0.8-1.8 V however we increased pause time to 25 minutes.  Doing well since last visit.  No hypersomnia or nonrestorative sleep no snoring.  Taking Tylenol PM before bed helps with arthritic aches and pains which wake him up.    12/21/2023: At last visit we kept settings the same at 0.8-1.8 V.  Advised to try to move up as tolerable with plans to order home sleep study today if continues to do well.  Currently at level 7 1.3 V.  Averaging 57 hours a week.  Reports continued improvement in hypersomnia nonrestorative sleep and snoring.  More discomfort at level 8 so he dropped back down to level 7.  Reports some concern about part of inspire device in pectoralis muscle rotating upwards causing a little bit of tenderness.  No significant discomfort.    Current Medications:    Current Outpatient Medications:     aspirin 81 MG EC tablet, Take 1 tablet by mouth Daily., Disp: , Rfl:    also entered in Sleep Questionnaire    Patient  has a past medical history of Anesthesia complication, Colovesical fistula (03/18/2020), Diverticulosis, DVT, lower extremity, Eczema, Factor V Leiden, Gallstones (12/14/2022), Pulmonary emboli (2018), Sleep apnea, and Thyroid nodule.    Social History:    Social History  "    Socioeconomic History    Marital status:    Tobacco Use    Smoking status: Never    Smokeless tobacco: Never   Vaping Use    Vaping Use: Never used   Substance and Sexual Activity    Alcohol use: Yes     Comment: 1 cocktail  daily, 3oz bourbon w gingerale    Drug use: Never    Sexual activity: Yes     Partners: Female       Allergies:  Amoxicillin, Heparin, Tetracyclines & related, and Hydrocodone-acetaminophen    Vital Signs:    Vitals:    12/21/23 1300   Pulse: 68   SpO2: 94%   Weight: 85.6 kg (188 lb 12.8 oz)   Height: 170.2 cm (67.01\")         Body mass index is 29.56 kg/m².    REVIEW OF SYSTEMS.  Full review of systems available on the intake form which is scanned in the media tab.  The relevant positive are noted below  Daytime excessive sleepiness with [place] Sleepiness Scale :Total score: 0   Snoring  All negative      Physical exam:  Vitals:    12/21/23 1300   Pulse: 68   SpO2: 94%   Weight: 85.6 kg (188 lb 12.8 oz)   Height: 170.2 cm (67.01\")        Body mass index is 29.56 kg/m².    HEENT: Head is atraumatic, normocephalic  Eyes: pupils are round equal and reacting to light and accommodation, conjunctiva normal  Throat: tongue normal  RESPIRATORY SYSTEM: Regular respirations  CARDIOVASULAR SYSTEM: Regular rate  EXTREMITES: No cyanosis, clubbing  NEUROLOGICAL SYSTEM: Oriented x 3, no gross motor defects, gait normal    Impression:  1. LUCAS (obstructive sleep apnea)    2. Intolerance of continuous positive airway pressure (CPAP) ventilation    3. Status post insertion of hypoglossal nerve stimulator    4. Encounter for interrogation of neurostimulator    5. Overweight with body mass index (BMI) 25.0-29.9       Cont at current voltage settings.  Continue to use inspire nightly.  Waveform run with inspire rep Hilary.  Follow-up home sleep study.  Discomfort and chest wall worsens advised patient to follow-up with surgeon, Dr. Cardoso to see if device can be externally manipulated.    Sohan Dawson, " MD  Sleep Medicine  12/21/23  13:18 EST

## 2024-02-20 ENCOUNTER — OFFICE VISIT (OUTPATIENT)
Dept: ORTHOPEDIC SURGERY | Facility: CLINIC | Age: 59
End: 2024-02-20
Payer: COMMERCIAL

## 2024-02-20 VITALS
SYSTOLIC BLOOD PRESSURE: 136 MMHG | HEIGHT: 67 IN | BODY MASS INDEX: 29.51 KG/M2 | DIASTOLIC BLOOD PRESSURE: 79 MMHG | WEIGHT: 188 LBS | OXYGEN SATURATION: 96 % | HEART RATE: 62 BPM

## 2024-02-20 DIAGNOSIS — M79.641 RIGHT HAND PAIN: Primary | ICD-10-CM

## 2024-02-20 DIAGNOSIS — M65.341 TRIGGER RING FINGER OF RIGHT HAND: ICD-10-CM

## 2024-02-20 DIAGNOSIS — M79.642 LEFT HAND PAIN: ICD-10-CM

## 2024-02-20 DIAGNOSIS — M25.832 MASS OF JOINT OF LEFT WRIST: ICD-10-CM

## 2024-02-20 PROCEDURE — 20550 NJX 1 TENDON SHEATH/LIGAMENT: CPT | Performed by: ORTHOPAEDIC SURGERY

## 2024-02-20 PROCEDURE — 99214 OFFICE O/P EST MOD 30 MIN: CPT | Performed by: ORTHOPAEDIC SURGERY

## 2024-02-20 RX ORDER — LIDOCAINE HYDROCHLORIDE 10 MG/ML
1 INJECTION, SOLUTION INFILTRATION; PERINEURAL
Status: COMPLETED | OUTPATIENT
Start: 2024-02-20 | End: 2024-02-20

## 2024-02-20 RX ORDER — TRIAMCINOLONE ACETONIDE 40 MG/ML
40 INJECTION, SUSPENSION INTRA-ARTICULAR; INTRAMUSCULAR
Status: COMPLETED | OUTPATIENT
Start: 2024-02-20 | End: 2024-02-20

## 2024-02-20 RX ADMIN — LIDOCAINE HYDROCHLORIDE 1 ML: 10 INJECTION, SOLUTION INFILTRATION; PERINEURAL at 09:15

## 2024-02-20 RX ADMIN — TRIAMCINOLONE ACETONIDE 40 MG: 40 INJECTION, SUSPENSION INTRA-ARTICULAR; INTRAMUSCULAR at 09:15

## 2024-02-20 NOTE — PROGRESS NOTES
"Chief Complaint  Follow-up and Pain of the Right Hand and Follow-up and Pain of the Left Hand     Subjective      Jaime Ruano presents to [facility] for follow up of bilateral hands. He has a cyst on the back of the left wrist and a trigger finger of the right hand ring finger.  He notes this has been going on for about 4 months. He notes decrease  and [facility] tasks.     Allergies   Allergen Reactions    Amoxicillin GI Intolerance     Diarrhea     Heparin Itching     Flush feeling    Tetracyclines & Related Other (See Comments)     Reaction unknown    Hydrocodone-Acetaminophen Itching     Feeling like something is crawling on him        Social History     Socioeconomic History    Marital status:    Tobacco Use    Smoking status: Never    Smokeless tobacco: Never   Vaping Use    Vaping Use: Never used   Substance and Sexual Activity    Alcohol use: Yes     Comment: 1 cocktail  daily, 3oz bourbon w gingerale    Drug use: Never    Sexual activity: Yes     Partners: Female        I reviewed the patient's chief complaint, history of present illness, review of systems, past medical history, surgical history, family history, social history, medications, and allergy list.     Review of Systems     Constitutional: Denies fevers, chills, weight loss  Cardiovascular: Denies chest pain, shortness of breath  Skin: Denies rashes, acute skin changes  Neurologic: Denies headache, loss of consciousness        Vital Signs:   /79   Pulse 62   Ht 170.2 cm (67\")   Wt 85.3 kg (188 lb)   SpO2 96%   BMI 29.44 kg/m²          Physical Exam  General: Alert. No acute distress    Ortho Exam        BILATERAL HANDS Negative Compression testing/ Negative Tinels. NegativeFinkelsteins. Negative Brown's testing. Negative [facility] grind testing. Negative Phalens. Full ROM of the hand, fingers, elbow and wrist. Positive Triggering of the digit. Sensation grossly intact to light touch, median, radial and " ulnar nerve. Positive AIN, PIN and ulnar nerve motor function intact. Axillary nerve intact. Positive pulses.        Right Trigger Finger  Consent given by: patient  Site marked: site marked  Timeout: Immediately prior to procedure a time out was called to verify the correct patient, procedure, equipment, support staff and site/side marked as required   Procedure Details  Location: -   Location: right trigger finger.Preparation: Patient was prepped and draped in the usual sterile fashion  Needle gauge: 23G.  Medications administered: 40 mg triamcinolone acetonide 40 MG/ML; 1 mL lidocaine 1 %  Patient tolerance: patient tolerated the procedure well with no immediate complications          Imaging Results (Most Recent)       None             Result Review :             Assessment and Plan     Diagnoses and all orders for this visit:    1. Right hand pain (Primary)    2. Left hand pain    3. Mass of joint of left wrist    4. Trigger ring finger of right hand        Discussed the treatment plan with the patient.     Discussed the treatment options with the patient, operative vs non-operative. Discussed excision of the nodule of the left wrist if gets worse.     The patient expressed understanding and wished to proceed with a right trigger finger steroid injection.  He tolerated the injection well.       Call or return if worsening symptoms.    Follow Up     PRN      Patient was given instructions and counseling regarding his condition or for health maintenance advice. Please see specific information pulled into the AVS if appropriate.     Scribed for Daphne Theodore MD by Ewelina Arzate MA.  02/20/24   09:20 EST    I have personally performed the services described in this document as scribed by the above individual and it is both accurate and complete. Daphne Theodore MD 02/20/24

## (undated) DEVICE — GOWN,REINFRCE,POLY,SIRUS,BREATH SLV,XXLG: Brand: MEDLINE

## (undated) DEVICE — GLV SURG SENSICARE SLT PF LF 6.5 STRL

## (undated) DEVICE — LAPAROVUE VISIBILITY SYSTEM LAPAROSCOPIC SOLUTIONS: Brand: LAPAROVUE

## (undated) DEVICE — SUT PROLN 6/0 P1 18IN 8697G

## (undated) DEVICE — SOL IRR NACL 0.9PCT 3000ML

## (undated) DEVICE — EMG TUBE 8229708 NIM TRIVANTAGE 8.0MM ID: Brand: NIM TRIVANTAGE™

## (undated) DEVICE — PROXIMATE RH ROTATING HEAD SKIN STAPLERS (35 WIDE) CONTAINS 35 STAINLESS STEEL STAPLES: Brand: PROXIMATE

## (undated) DEVICE — 3 RING SUTURE PASSER - 16 CM: Brand: 3 RING SUTURE PASSER - 16 CM

## (undated) DEVICE — SLV SCD KN/LEN ADJ EXPRSS BLENDED MD 1P/U

## (undated) DEVICE — DEV OPN LIGASURE DISSCT EXACT 40DEG 21.6MM BX/1

## (undated) DEVICE — ENT-LF: Brand: MEDLINE INDUSTRIES, INC.

## (undated) DEVICE — ELECTRD BLD EDGE/INSUL1P 2.4X5.1MM STRL

## (undated) DEVICE — ADHS SKIN SURG TISS VISC PREMIERPRO EXOFIN HI/VISC FAST/DRY

## (undated) DEVICE — CANN O2 ETCO2 FITS ALL CONN CO2 SMPL A/ 7IN DISP LF

## (undated) DEVICE — PROBE 8225825 3PK INCREMT STD PRASS ROHS

## (undated) DEVICE — ENDOPATH XCEL WITH OPTIVIEW TECHNOLOGY UNIVERSAL TROCAR STABILITY SLEEVES: Brand: ENDOPATH XCEL OPTIVIEW

## (undated) DEVICE — ENDOPATH XCEL WITH OPTIVIEW TECHNOLOGY BLADELESS TROCARS WITH STABILITY SLEEVES: Brand: ENDOPATH XCEL OPTIVIEW

## (undated) DEVICE — INTENDED FOR TISSUE SEPARATION, AND OTHER PROCEDURES THAT REQUIRE A SHARP SURGICAL BLADE TO PUNCTURE OR CUT.: Brand: BARD-PARKER ® CARBON RIB-BACK BLADES

## (undated) DEVICE — SLV SCD LEG COMFORT KENDALLSCD MD REPROC

## (undated) DEVICE — GLV SURG BIOGEL LTX PF 7 1/2

## (undated) DEVICE — DISC DVD/R SPINDLE VERBATIM LIFE/SER

## (undated) DEVICE — TISSUE RETRIEVAL SYSTEM: Brand: INZII RETRIEVAL SYSTEM

## (undated) DEVICE — STERILE POLYISOPRENE POWDER-FREE SURGICAL GLOVES WITH EMOLLIENT COATING: Brand: PROTEXIS

## (undated) DEVICE — 2, DISPOSABLE SUCTION/IRRIGATOR WITHOUT DISPOSABLE TIP: Brand: STRYKEFLOW

## (undated) DEVICE — ANTIBACTERIAL UNDYED BRAIDED (POLYGLACTIN 910), SYNTHETIC ABSORBABLE SUTURE: Brand: COATED VICRYL

## (undated) DEVICE — ROUND DISSECTORS: Brand: DEROYAL

## (undated) DEVICE — Device

## (undated) DEVICE — GLV SURG SENSICARE SLT PF LF 7.5 STRL

## (undated) DEVICE — VAGINAL PREP TRAY: Brand: MEDLINE INDUSTRIES, INC.

## (undated) DEVICE — GENERAL LAPAROSCOPY-LF: Brand: MEDLINE INDUSTRIES, INC.

## (undated) DEVICE — KT ANTI FOG W/FLD AND SPNG

## (undated) DEVICE — SUT VIC PLS CTD BR 0 TIE 18IN VIL

## (undated) DEVICE — STERILE POLYISOPRENE POWDER-FREE SURGICAL GLOVES: Brand: PROTEXIS

## (undated) DEVICE — LAPAROSCOPIC SCISSORS: Brand: EPIX LAPAROSCOPIC SCISSORS

## (undated) DEVICE — SUT ETHLN 3-0 FS118IN 663H

## (undated) DEVICE — ENDOPATH PNEUMONEEDLE INSUFFLATION NEEDLES WITH LUER LOCK CONNECTORS 120MM: Brand: ENDOPATH

## (undated) DEVICE — PENCL E/S SMOKEEVAC W/TELESCP CANN

## (undated) DEVICE — SOL IRR NACL 0.9PCT BT 1000ML